# Patient Record
Sex: FEMALE | Race: OTHER | Employment: OTHER | URBAN - METROPOLITAN AREA
[De-identification: names, ages, dates, MRNs, and addresses within clinical notes are randomized per-mention and may not be internally consistent; named-entity substitution may affect disease eponyms.]

---

## 2017-01-05 RX ORDER — HYDROCHLOROTHIAZIDE 12.5 MG/1
12.5 TABLET ORAL DAILY
Qty: 60 TAB | Refills: 5 | Status: SHIPPED | OUTPATIENT
Start: 2017-01-05 | End: 2017-07-15 | Stop reason: SDUPTHER

## 2017-06-13 ENCOUNTER — OFFICE VISIT (OUTPATIENT)
Dept: INTERNAL MEDICINE CLINIC | Age: 67
End: 2017-06-13

## 2017-06-13 VITALS
DIASTOLIC BLOOD PRESSURE: 78 MMHG | WEIGHT: 157 LBS | HEART RATE: 85 BPM | HEIGHT: 63 IN | OXYGEN SATURATION: 95 % | TEMPERATURE: 98.5 F | RESPIRATION RATE: 16 BRPM | SYSTOLIC BLOOD PRESSURE: 135 MMHG | BODY MASS INDEX: 27.82 KG/M2

## 2017-06-13 DIAGNOSIS — E78.00 ELEVATED LDL CHOLESTEROL LEVEL: ICD-10-CM

## 2017-06-13 DIAGNOSIS — Z12.11 COLON CANCER SCREENING: ICD-10-CM

## 2017-06-13 DIAGNOSIS — Z00.00 ENCOUNTER FOR MEDICARE ANNUAL WELLNESS EXAM: Primary | ICD-10-CM

## 2017-06-13 DIAGNOSIS — Z78.0 POSTMENOPAUSAL: ICD-10-CM

## 2017-06-13 DIAGNOSIS — Z11.59 NEED FOR HEPATITIS C SCREENING TEST: ICD-10-CM

## 2017-06-13 DIAGNOSIS — Z91.89 AT RISK FOR SIDE EFFECT OF MEDICATION: ICD-10-CM

## 2017-06-13 DIAGNOSIS — M85.80 OSTEOPENIA, UNSPECIFIED LOCATION: ICD-10-CM

## 2017-06-13 DIAGNOSIS — Z12.31 ENCOUNTER FOR SCREENING MAMMOGRAM FOR BREAST CANCER: ICD-10-CM

## 2017-06-13 PROBLEM — Z71.89 ADVANCE DIRECTIVE DISCUSSED WITH PATIENT: Status: ACTIVE | Noted: 2017-06-13

## 2017-06-13 NOTE — PROGRESS NOTES
Ofe Cordero is a 75 y.o. female and presents for annual Medicare Wellness Visit. She has URI symptoms 5 days ago. She has nasal congestion that is clear. No fevers, chills, night sweats. Dry cough. She denies any chest pain, shortness of breath, abdominal pain, headaches, but has dizziness. She said she can be watching TV or driving and gets dizzy. She does not want to take anything. She also has tingling in her fingers and pain in right hip area. Think this is due to arthritis, but osteopenia maybe worsening. Will do dexa scan. Problem List: Reviewed with patient and discussed risk factors. Patient Active Problem List   Diagnosis Code    Advance directive discussed with patient Z71.89       Current medical providers:  Patient Care Team:  Isaias Oakley MD as PCP - General (Internal Medicine)  Ruben Reid MD (Internal Medicine)    PSH: Reviewed with patient  Past Surgical History:   Procedure Laterality Date    HX CATARACT REMOVAL  4/2013    left    HX OTHER SURGICAL  1975    dnetal upper bridge    HX TUBAL LIGATION          SH: Reviewed with patient  Social History   Substance Use Topics    Smoking status: Never Smoker    Smokeless tobacco: Never Used    Alcohol use 0.5 oz/week     1 Glasses of wine per week      Comment: Socially. FH: Reviewed with patient  History reviewed. No pertinent family history. Medications/Allergies: Reviewed with patient  Current Outpatient Prescriptions on File Prior to Visit   Medication Sig Dispense Refill    hydroCHLOROthiazide (HYDRODIURIL) 12.5 mg tablet Take 1 Tab by mouth daily. 60 Tab 5    meclizine (ANTIVERT) 25 mg tablet Take 1 Tab by mouth three (3) times daily as needed. 15 Tab 0     No current facility-administered medications on file prior to visit.        Allergies   Allergen Reactions    Peanut Anaphylaxis    Scopolamine Nausea Only       Objective:  Visit Vitals    /78 (BP 1 Location: Right arm, BP Patient Position: Sitting)    Pulse 85    Temp 98.5 °F (36.9 °C) (Oral)    Resp 16    Ht 5' 3\" (1.6 m)    Wt 157 lb (71.2 kg)    SpO2 95%    BMI 27.81 kg/m2    Body mass index is 27.81 kg/(m^2). Assessment of cognitive impairment: Alert and oriented x 3    Depression Screen:   PHQ over the last two weeks 6/13/2017   Little interest or pleasure in doing things Not at all   Feeling down, depressed or hopeless Not at all   Total Score PHQ 2 0   Trouble falling or staying asleep, or sleeping too much -   Feeling tired or having little energy -   Poor appetite or overeating -   Feeling bad about yourself - or that you are a failure or have let yourself or your family down -   Trouble concentrating on things such as school, work, reading or watching TV -   Moving or speaking so slowly that other people could have noticed; or the opposite being so fidgety that others notice -   Thoughts of being better off dead, or hurting yourself in some way -   PHQ 9 Score -   How difficult have these problems made it for you to do your work, take care of your home and get along with others -       Fall Risk Assessment:    Fall Risk Assessment, last 12 mths 6/13/2017   Able to walk? Yes   Fall in past 12 months? No   Fall with injury? -   Number of falls in past 12 months -   Fall Risk Score -       Functional Ability:   Does the patient exhibit a steady gait? yes   How long did it take the patient to get up and walk from a sitting position? 4 seconds   Is the patient self reliant?  (ie can do own laundry, meals, household chores)  yes     Does the patient handle his/her own medications? yes     Does the patient handle his/her own money? yes     Is the patients home safe (ie good lighting, handrails on stairs and bath, etc.)? yes     Did you notice or did patient express any hearing difficulties? no     Did you notice or did patient express any vision difficulties? yes     Were distance and reading eye charts used?   no       Advance Care Planning:   Patient was offered the opportunity to discuss advance care planning:  yes     Does patient have an Advance Directive:  no   If no, did you provide information on Caring Connections? yes       Plan:      Orders Placed This Encounter    DEXA BONE DENSITY STUDY AXIAL    OCCULT BLOOD, IMMUNOASSAY (FIT)    LIPID PANEL    METABOLIC PANEL, COMPREHENSIVE    HEPATITIS C AB    VITAMIN D, 25 HYDROXY       Health Maintenance   Topic Date Due    Hepatitis C Screening  1950    DTaP/Tdap/Td series (1 - Tdap) 03/09/1970    BREAST CANCER SCRN MAMMOGRAM  03/09/1999    FOBT Q 1 YEAR AGE 50-75  09/08/2016    INFLUENZA AGE 9 TO ADULT  08/01/2017    Pneumococcal 65+ Low/Medium Risk (2 of 2 - PPSV23) 01/11/2018    GLAUCOMA SCREENING Q2Y  06/03/2018    MEDICARE YEARLY EXAM  06/14/2018    OSTEOPOROSIS SCREENING (DEXA)  Completed    ZOSTER VACCINE AGE 60>  Completed       *Patient verbalized understanding and agreement with the plan. A copy of the After Visit Summary with personalized health plan was given to the patient today.           ROS:   General: negative for - chills, fatigue, fever, weight loss or weight gain, night sweats  HEENT: negative for - no sore throat, nasal congestion, vision problems or ear problems  Resp: negative for - cough, shortness of breath or wheezing  CV: negative for - chest pain, palpitations, orthopnea or PND,  GI: negative for - abdominal pain, change in bowel habits, constipation, diarrhea, blood or black tarry stools, or nausea/vomiting  : negative for - dysuria, hematuria, incontinence, pelvic pain or vulvar/vaginal symptoms  Heme: negative for -excessive bleeding or bruising  Endo: negative for - hot flashes, polydipsia/polyuria or hot or cold intolerance  MSK: negative for -, joint swelling or muscle pain, positive for  joint pain  Neuro: negative for - numbness,headache or dizziness, positive for  tingling,   Derm: negative for - dry skin, hair changes, rash or skin lesion changes  Psych: negative for - anxiety, depression, irritability or mood swings or insomnia    OBJECTIVE:  PHYSICAL EXAM: Vitals:   Vitals:    06/13/17 1322   BP: 135/78   Pulse: 85   Resp: 16   Temp: 98.5 °F (36.9 °C)   TempSrc: Oral   SpO2: 95%   Weight: 157 lb (71.2 kg)   Height: 5' 3\" (1.6 m)     Generally: Pleasant female in no acute distress    HEENT exam: Head: atraumatic     Eyes: Pupils equally round and reactive to light, Fundoscopic exam is                       normal               Ears: bilaterally: Normal tympanic membrane, no erythema or exudate,                         normal light Reflex, but ears bilateral have fluid    Nares: moist mucosa, no erythema               Mouth: clear, no erythema or exudate     Neck: supple, no lymphadenopathy, negative thyromegaly, negative                                   carotid bruits bilaterally    Cardiac exam: regular, rate, and rhythm. No murmurs, gallops, or rubs. Normal S1 and S2.    Pulmonary exam: Clear to ausculation bilaterally    Abdominal exam: Positive bowel sounds in all four quadrants, soft, nondistended, nontender. No hepatosplenomegaly. Extremities: 2+ dorsalis pedis bilaterally. No pedal edema bilaterally.              LABS/RADIOLOGICAL TESTS:   Lab Results   Component Value Date/Time    WBC 8.3 10/25/2016 02:32 AM    HGB 14.1 10/25/2016 02:32 AM    HCT 42.5 10/25/2016 02:32 AM    PLATELET 783 60/31/4197 02:32 AM     Lab Results   Component Value Date/Time    Sodium 144 10/25/2016 02:32 AM    Potassium 3.9 10/25/2016 02:32 AM    Chloride 99 10/25/2016 02:32 AM    CO2 27 10/25/2016 02:32 AM    Glucose 79 10/25/2016 02:32 AM    BUN 13 10/25/2016 02:32 AM    Creatinine 0.79 10/25/2016 02:32 AM     Lab Results   Component Value Date/Time    Cholesterol, total 217 05/19/2014 03:35 PM    HDL Cholesterol 65 05/19/2014 03:35 PM    LDL, calculated 122 05/19/2014 03:35 PM    Triglyceride 151 05/19/2014 03:35 PM     No results found for: GPT    Previous labs      ASSESSMENT/PLAN:      ICD-10-CM ICD-9-CM    1. Encounter for Medicare annual wellness exam Z00.00 V70.0    2. Osteopenia, unspecified location M85.80 733.90 VITAMIN D, 25 HYDROXY      DEXA BONE DENSITY STUDY AXIAL   3. Postmenopausal Z78.0 V49.81 DEXA BONE DENSITY STUDY AXIAL   4. Colon cancer screening Z12.11 V76.51 OCCULT BLOOD, IMMUNOASSAY (FIT)   5. At risk for side effect of medication H59.03 L27.10 METABOLIC PANEL, COMPREHENSIVE   6. Elevated LDL cholesterol level E78.00 272.0 LIPID PANEL   7. Need for hepatitis C screening test Z11.59 V73.89 HEPATITIS C AB   She takes HCTZ for swelling in her legs. 8. Ordered mammogram  9.. Patient verbalized understanding and agreement with the plan. 10.  Patient was given after visit summary. 11.   Follow-up Disposition:  Return in about 1 year (around 6/13/2018) for medicare wellness subsequent exam. or sooner if worsening symptoms.         Jaspal Lucas MD

## 2017-06-13 NOTE — ACP (ADVANCE CARE PLANNING)
Advance Care Planning (ACP) Provider Conversation Snapshot    Date of ACP Conversation: 06/13/17  Persons included in Conversation:  patient  Length of ACP Conversation in minutes:  16 minutes    Authorized Decision Maker (if patient is incapable of making informed decisions):    This person is:   none          For Patients with Decision Making Capacity:   Values/Goals: Exploration of values, goals, and preferences if recovery is not expected, even with continued medical treatment in the event of:  Imminent death     Conversation Outcomes / Follow-Up Plan:   Recommended completion of Advance Directive form after review of ACP materials and conversation with prospective healthcare agent

## 2017-06-13 NOTE — PROGRESS NOTES
ROOM # 2    Lidia Queen presents today for   Chief Complaint   Patient presents with   Stacie Hui Annual Wellness Visit         Hypertension    Cough     since last friday    Finger Pain       Lidia Queen preferred language for health care discussion is english/other.     Is someone accompanying this pt? no    Is the patient using any DME equipment during OV? no    Depression Screening:  PHQ over the last two weeks 6/13/2017 10/25/2016 4/21/2016 6/2/2015 5/19/2014   Little interest or pleasure in doing things Not at all Nearly every day Several days Not at all Not at all   Feeling down, depressed or hopeless Not at all Nearly every day Several days Not at all Not at all   Total Score PHQ 2 0 6 2 0 0   Trouble falling or staying asleep, or sleeping too much - Nearly every day - - -   Feeling tired or having little energy - Nearly every day - - -   Poor appetite or overeating - Not at all - - -   Feeling bad about yourself - or that you are a failure or have let yourself or your family down - Not at all - - -   Trouble concentrating on things such as school, work, reading or watching TV - Not at all - - -   Moving or speaking so slowly that other people could have noticed; or the opposite being so fidgety that others notice - Not at all - - -   Thoughts of being better off dead, or hurting yourself in some way - Not at all - - -   PHQ 9 Score - 12 - - -   How difficult have these problems made it for you to do your work, take care of your home and get along with others - Not difficult at all - - -       Learning Assessment:  Learning Assessment 5/19/2014   PRIMARY LEARNER Patient   HIGHEST LEVEL OF EDUCATION - PRIMARY LEARNER  SOME COLLEGE   BARRIERS PRIMARY LEARNER NONE   PRIMARY LANGUAGE ENGLISH    NEED No   LEARNER PREFERENCE PRIMARY DEMONSTRATION   LEARNING SPECIAL TOPICS no   ANSWERED BY patient   RELATIONSHIP SELF       Abuse Screening:  Abuse Screening Questionnaire 6/13/2017 12/14/2015 9/8/2015   Do you ever feel afraid of your partner? N N N   Are you in a relationship with someone who physically or mentally threatens you? N N N   Is it safe for you to go home? Jaycee Calvillo       Fall Risk  Fall Risk Assessment, last 12 mths 6/13/2017 10/25/2016 4/21/2016 6/2/2015 6/2/2015 5/19/2014   Able to walk? Yes Yes Yes Yes Yes Yes   Fall in past 12 months? No Yes No Yes No No   Fall with injury? - No - Yes - -   Number of falls in past 12 months - 5 - 1 - -   Fall Risk Score - 5 - 2 - -       Health Maintenance reviewed and discussed per provider. Yes    Lucero Alves is due for Hep C (pending) Tdap, Mammogram, zoster, pneu (pending). fit/colon, MWV (doing today). .  Please order/place referral if appropriate. Advance Directive:  1. Do you have an advance directive in place? Patient Reply: no    2. If not, would you like material regarding how to put one in place? Patient Reply: yes    Coordination of Care:  1. Have you been to the ER, urgent care clinic since your last visit? Hospitalized since your last visit? no    2. Have you seen or consulted any other health care providers outside of the [address] [place] [address] since your last visit? Include any pap smears or colon screening.  no

## 2017-06-13 NOTE — MR AVS SNAPSHOT
Visit Information Date & Time Provider Department Dept. Phone Encounter #  
 6/13/2017  1:30 PM Tammie Mehta MD [facility] [phone] [id number] Follow-up Instructions Return in about 1 year (around 6/13/2018) for medicare wellness subsequent exam. Upcoming Health Maintenance Date Due Hepatitis C Screening 1950 DTaP/Tdap/Td series (1 - Tdap) 3/9/1970 BREAST CANCER SCRN MAMMOGRAM 3/9/1999 FOBT Q 1 YEAR AGE 50-75 9/8/2016 INFLUENZA AGE 9 TO ADULT 8/1/2017 Pneumococcal 65+ Low/Medium Risk (2 of 2 - PPSV23) 1/11/2018 GLAUCOMA SCREENING Q2Y 6/3/2018 MEDICARE YEARLY EXAM 6/14/2018 Allergies as of 6/13/2017  Review Complete On: 6/13/2017 By: Tammie Mehta MD  
  
 Severity Noted Reaction Type Reactions Peanut High 09/08/2015    Anaphylaxis Scopolamine  10/25/2016    Nausea Only Current Immunizations  Never Reviewed Name Date Influenza High Dose Vaccine PF 10/25/2016 Not reviewed this visit You Were Diagnosed With   
  
 Codes Comments Colon cancer screening    -  Primary ICD-10-CM: Z12.11 ICD-9-CM: V76.51 Need for hepatitis C screening test     ICD-10-CM: Z11.59 
ICD-9-CM: V73.89 At risk for side effect of medication     ICD-10-CM: Z91.89 ICD-9-CM: V49.89 Osteopenia, unspecified location     ICD-10-CM: M85.80 ICD-9-CM: 733.90 Elevated LDL cholesterol level     ICD-10-CM: E78.00 ICD-9-CM: 272.0 Postmenopausal     ICD-10-CM: Z78.0 ICD-9-CM: V49.81 Vitals BP Pulse Temp Resp Height(growth percentile) Weight(growth percentile) 135/78 (BP 1 Location: Right arm, BP Patient Position: Sitting) 85 98.5 °F (36.9 °C) (Oral) 16 5' 3\" (1.6 m) 157 lb (71.2 kg) SpO2 BMI OB Status Smoking Status 95% 27.81 kg/m2 Postmenopausal Never Smoker Vitals History BMI and BSA Data  Body Mass Index Body Surface Area  
 27.81 kg/m 2 1.78 m 2  
  
  
 Preferred Pharmacy Pharmacy Name Phone [address], [address] [facility] [phone] Your Updated Medication List  
  
   
This list is accurate as of: 6/13/17  1:48 PM.  Always use your most recent med list.  
  
  
  
  
 hydroCHLOROthiazide 12.5 mg tablet Commonly known as:  HYDRODIURIL Take 1 Tab by mouth daily. meclizine 25 mg tablet Commonly known as:  ANTIVERT Take 1 Tab by mouth three (3) times daily as needed. Follow-up Instructions Return in about 1 year (around 6/13/2018) for medicare wellness subsequent exam. To-Do List   
 06/15/2017 Imaging:  DEXA BONE DENSITY STUDY AXIAL   
  
 07/13/2017 Lab:  HEPATITIS C AB   
  
 07/13/2017 Lab:  LIPID PANEL   
  
 07/13/2017 Lab:  METABOLIC PANEL, COMPREHENSIVE   
  
 07/13/2017 Lab:  OCCULT BLOOD, IMMUNOASSAY (FIT)   
  
 07/13/2017 Lab:  VITAMIN D, 25 HYDROXY Patient Instructions 1) follow-up in 1 yr or sooner if worsening symptoms. Upper Respiratory Infection (Cold): Care Instructions Your Care Instructions An upper respiratory infection, or URI, is an infection of the nose, sinuses, or throat. URIs are spread by coughs, sneezes, and direct contact. The common cold is the most frequent kind of URI. The flu and sinus infections are other kinds of URIs. Almost all URIs are caused by viruses. Antibiotics won't cure them. But you can treat most infections with home care. This may include drinking lots of fluids and taking over-the-counter pain medicine. You will probably feel better in 4 to 10 days. The doctor has checked you carefully, but problems can develop later. If you notice any problems or new symptoms, get medical treatment right away. Follow-up care is a key part of your treatment and safety.  Be sure to make and go to all appointments, and call your doctor if you are having problems. It's also a good idea to know your test results and keep a list of the medicines you take. How can you care for yourself at home? · To prevent dehydration, drink plenty of fluids, enough so that your urine is light yellow or clear like water. Choose water and other caffeine-free clear liquids until you feel better. If you have kidney, heart, or liver disease and have to limit fluids, talk with your doctor before you increase the amount of fluids you drink. · Take an over-the-counter pain medicine, such as acetaminophen (Tylenol), ibuprofen (Advil, Motrin), or naproxen (Aleve). Read and follow all instructions on the label. · Before you use cough and cold medicines, check the label. These medicines may not be safe for young children or for people with certain health problems. · Be careful when taking over-the-counter cold or flu medicines and Tylenol at the same time. Many of these medicines have acetaminophen, which is Tylenol. Read the labels to make sure that you are not taking more than the recommended dose. Too much acetaminophen (Tylenol) can be harmful. · Get plenty of rest. 
· Do not smoke or allow others to smoke around you. If you need help quitting, talk to your doctor about stop-smoking programs and medicines. These can increase your chances of quitting for good. When should you call for help? Call 911 anytime you think you may need emergency care. For example, call if: 
· You have severe trouble breathing. Call your doctor now or seek immediate medical care if: 
· You seem to be getting much sicker. · You have new or worse trouble breathing. · You have a new or higher fever. · You have a new rash. Watch closely for changes in your health, and be sure to contact your doctor if: 
· You have a new symptom, such as a sore throat, an earache, or sinus pain. · You cough more deeply or more often, especially if you notice more mucus or a change in the color of your mucus. · You do not get better as expected. Where can you learn more? Go to http://kimo-alex.info/. Enter F267 in the search box to learn more about \"Upper Respiratory Infection (Cold): Care Instructions. \" Current as of: June 30, 2016 Content Version: 11.2 © 7055-0635 [employer]. Care instructions adapted under license by [employer] (which disclaims liability or warranty for this information). If you have questions about a medical condition or this instruction, always ask your healthcare professional. [address] any warranty or liability for your use of this information. Introducing [facility]! [facility] introduces [employer] patient portal. Now you can access parts of your medical record, email your doctor's office, and request medication refills online. 1. In your internet browser, go to [email or web address] 2. Click on the First Time User? Click Here link in the Sign In box. You will see the New Member Sign Up page. 3. Enter your [employer] Access Code exactly as it appears below. You will not need to use this code after youve completed the sign-up process. If you do not sign up before the expiration date, you must request a new code. · [employer] Access Code: [email or web address] Expires: 9/11/2017  1:47 PM 
 
4. Enter the last four digits of your Social Security Number (xxxx) and Date of Birth (mm/dd/yyyy) as indicated and click Submit. You will be taken to the next sign-up page. 5. Create a idemamat ID. This will be your [employer] login ID and cannot be changed, so think of one that is secure and easy to remember. 6. Create a [employer] password. You can change your password at any time. 7. Enter your Password Reset Question and Answer. This can be used at a later time if you forget your password. 8. Enter your e-mail address. You will receive e-mail notification when new information is available in [address] 9. Click Sign Up. You can now view and download portions of your medical record. 10. Click the Download Summary menu link to download a portable copy of your medical information. If you have questions, please visit the Frequently Asked Questions section of the [employer] website. Remember, [employer] is NOT to be used for urgent needs. For medical emergencies, dial 911. Now available from your iPhone and Android! Please provide this summary of care documentation to your next provider. Your primary care clinician is listed as Heber Tapia. If you have any questions after today's visit, please call [phone].

## 2017-06-13 NOTE — PATIENT INSTRUCTIONS
1) follow-up in 1 yr or sooner if worsening symptoms. Upper Respiratory Infection (Cold): Care Instructions  Your Care Instructions    An upper respiratory infection, or URI, is an infection of the nose, sinuses, or throat. URIs are spread by coughs, sneezes, and direct contact. The common cold is the most frequent kind of URI. The flu and sinus infections are other kinds of URIs. Almost all URIs are caused by viruses. Antibiotics won't cure them. But you can treat most infections with home care. This may include drinking lots of fluids and taking over-the-counter pain medicine. You will probably feel better in 4 to 10 days. The doctor has checked you carefully, but problems can develop later. If you notice any problems or new symptoms, get medical treatment right away. Follow-up care is a key part of your treatment and safety. Be sure to make and go to all appointments, and call your doctor if you are having problems. It's also a good idea to know your test results and keep a list of the medicines you take. How can you care for yourself at home? · To prevent dehydration, drink plenty of fluids, enough so that your urine is light yellow or clear like water. Choose water and other caffeine-free clear liquids until you feel better. If you have kidney, heart, or liver disease and have to limit fluids, talk with your doctor before you increase the amount of fluids you drink. · Take an over-the-counter pain medicine, such as acetaminophen (Tylenol), ibuprofen (Advil, Motrin), or naproxen (Aleve). Read and follow all instructions on the label. · Before you use cough and cold medicines, check the label. These medicines may not be safe for young children or for people with certain health problems. · Be careful when taking over-the-counter cold or flu medicines and Tylenol at the same time. Many of these medicines have acetaminophen, which is Tylenol.  Read the labels to make sure that you are not taking more than the recommended dose. Too much acetaminophen (Tylenol) can be harmful. · Get plenty of rest.  · Do not smoke or allow others to smoke around you. If you need help quitting, talk to your doctor about stop-smoking programs and medicines. These can increase your chances of quitting for good. When should you call for help? Call 911 anytime you think you may need emergency care. For example, call if:  · You have severe trouble breathing. Call your doctor now or seek immediate medical care if:  · You seem to be getting much sicker. · You have new or worse trouble breathing. · You have a new or higher fever. · You have a new rash. Watch closely for changes in your health, and be sure to contact your doctor if:  · You have a new symptom, such as a sore throat, an earache, or sinus pain. · You cough more deeply or more often, especially if you notice more mucus or a change in the color of your mucus. · You do not get better as expected. Where can you learn more? Go to http://kimo-alex.info/. Enter D762 in the search box to learn more about \"Upper Respiratory Infection (Cold): Care Instructions. \"  Current as of: June 30, 2016  Content Version: 11.2  © 3118-4968 [employer]. Care instructions adapted under license by [employer] (which disclaims liability or warranty for this information). If you have questions about a medical condition or this instruction, always ask your healthcare professional. [place] [address] any warranty or liability for your use of this information.

## 2017-06-29 ENCOUNTER — TELEPHONE (OUTPATIENT)
Dept: INTERNAL MEDICINE CLINIC | Age: 67
End: 2017-06-29

## 2017-06-29 NOTE — TELEPHONE ENCOUNTER
Please let pt know that labs drawn on 6/27/17 were normal except :    1) lipids are high. TC: 214 and needs to be 200 or less, Trig 168 and needs to be 150 or less, LDL up at 121 and needs to be 100 or less. Start fish oil 1000mg (DHA+EPA=1000mg) 2 po daily. Work on diet and exercise. Mail low chol diet info to pt.     2) stool study negative for blood.

## 2017-07-06 DIAGNOSIS — Z11.59 NEED FOR HEPATITIS C SCREENING TEST: ICD-10-CM

## 2017-07-06 DIAGNOSIS — E78.00 ELEVATED LDL CHOLESTEROL LEVEL: ICD-10-CM

## 2017-07-06 DIAGNOSIS — M85.80 OSTEOPENIA, UNSPECIFIED LOCATION: ICD-10-CM

## 2017-07-06 DIAGNOSIS — Z12.11 COLON CANCER SCREENING: ICD-10-CM

## 2017-07-06 DIAGNOSIS — Z91.89 AT RISK FOR SIDE EFFECT OF MEDICATION: ICD-10-CM

## 2017-08-03 ENCOUNTER — TELEPHONE (OUTPATIENT)
Dept: INTERNAL MEDICINE CLINIC | Age: 67
End: 2017-08-03

## 2017-08-03 ENCOUNTER — HOSPITAL ENCOUNTER (OUTPATIENT)
Dept: LAB | Age: 67
Discharge: HOME | End: 2017-08-03

## 2017-08-03 ENCOUNTER — OFFICE VISIT (OUTPATIENT)
Dept: INTERNAL MEDICINE CLINIC | Age: 67
End: 2017-08-03

## 2017-08-03 VITALS
DIASTOLIC BLOOD PRESSURE: 88 MMHG | TEMPERATURE: 97.9 F | BODY MASS INDEX: 27.68 KG/M2 | WEIGHT: 156.2 LBS | HEIGHT: 63 IN | OXYGEN SATURATION: 97 % | SYSTOLIC BLOOD PRESSURE: 141 MMHG | RESPIRATION RATE: 18 BRPM | HEART RATE: 76 BPM

## 2017-08-03 DIAGNOSIS — H53.9 VISION CHANGES: Primary | ICD-10-CM

## 2017-08-03 DIAGNOSIS — H53.131 SUDDEN VISUAL LOSS OF RIGHT EYE: ICD-10-CM

## 2017-08-03 PROCEDURE — 99001 SPECIMEN HANDLING PT-LAB: CPT | Performed by: NURSE PRACTITIONER

## 2017-08-03 RX ORDER — ASPIRIN 81 MG/1
TABLET ORAL DAILY
COMMUNITY

## 2017-08-03 NOTE — MR AVS SNAPSHOT
Visit Information Date & Time Provider Department Dept. Phone Encounter #  
 8/3/2017  4:30 PM Nini Li NP [phone] Follow-up Instructions Return in about 1 week (around 8/10/2017) for F/up. Upcoming Health Maintenance Date Due INFLUENZA AGE 9 TO ADULT 8/1/2017 DTaP/Tdap/Td series (1 - Tdap) 11/11/2017* BREAST CANCER SCRN MAMMOGRAM 1/27/2018* Pneumococcal 65+ Low/Medium Risk (2 of 2 - PPSV23) 1/11/2018 MEDICARE YEARLY EXAM 6/14/2018 FOBT Q 1 YEAR AGE 50-75 6/27/2018 GLAUCOMA SCREENING Q2Y 1/24/2019 *Topic was postponed. The date shown is not the original due date. Allergies as of 8/3/2017  Review Complete On: 6/13/2017 By: Chris Kirby MD  
  
 Severity Noted Reaction Type Reactions Peanut High 09/08/2015    Anaphylaxis Scopolamine  10/25/2016    Nausea Only Current Immunizations  Never Reviewed Name Date Influenza High Dose Vaccine PF 10/25/2016 Not reviewed this visit You Were Diagnosed With   
  
 Codes Comments Vision changes    -  Primary ICD-10-CM: H53.9 ICD-9-CM: 368.9 Sudden visual loss of right eye     ICD-10-CM: H53.131 ICD-9-CM: 368.11 Vitals BP Pulse Temp Resp Height(growth percentile) Weight(growth percentile) 141/88 (BP 1 Location: Right arm, BP Patient Position: Sitting) 76 97.9 °F (36.6 °C) (Oral) 18 5' 3\" (1.6 m) 156 lb 3.2 oz (70.9 kg) SpO2 BMI OB Status Smoking Status 97% 27.67 kg/m2 Postmenopausal Never Smoker BMI and BSA Data Body Mass Index Body Surface Area  
 27.67 kg/m 2 1.78 m 2 Preferred Pharmacy Pharmacy Name Phone [address] [facility], [address] [place] [address] [phone] Your Updated Medication List  
  
   
This list is accurate as of: 8/3/17  5:15 PM.  Always use your most recent med list.  
  
  
  
  
 aspirin delayed-release 81 mg tablet Take  by mouth daily. FISH OIL OMEGA 3-6-9 PO Take  by mouth. hydroCHLOROthiazide 12.5 mg tablet Commonly known as:  HYDRODIURIL  
TAKE 1 TABLET BY MOUTH DAILY(GENERIC FOR HYDRODIURIL) meclizine 25 mg tablet Commonly known as:  ANTIVERT Take 1 Tab by mouth three (3) times daily as needed. We Performed the Following AMB POC EKG ROUTINE W/ 12 LEADS, INTER & REP [34765 CPT(R)] Follow-up Instructions Return in about 1 week (around 8/10/2017) for F/up. To-Do List   
 08/03/2017 Lab:  C REACTIVE PROTEIN, QT   
  
 08/03/2017 Lab:  CBC W/O DIFF   
  
 08/03/2017 Lab:  SED RATE (ESR) 08/04/2017 ECHO:  2D ECHO COMPLETE ADULT (TTE) W OR WO CONTR   
  
 08/04/2017 Imaging:  DUPLEX CAROTID BILATERAL   
  
 08/04/2017 Imaging:  MRA BRAIN W WO CONT   
  
 08/04/2017 Imaging:  MRI BRAIN W WO CONT Referral Information Referral ID Referred By Referred To  
  
 [id number] Funmi Monaco Not Available Visits Status Start Date End Date 1 New Request 8/3/17 8/3/18 If your referral has a status of pending review or denied, additional information will be sent to support the outcome of this decision. Referral ID Referred By Referred To  
 [id number] Funmi Monaco Not Available Visits Status Start Date End Date 1 New Request 8/3/17 8/3/18 If your referral has a status of pending review or denied, additional information will be sent to support the outcome of this decision. Introducing [address]! [facility] introduces ClarityAd patient portal. Now you can access parts of your medical record, email your doctor's office, and request medication refills online. 1. In your internet browser, go to [email or web address] 2. Click on the First Time User? Click Here link in the Sign In box. You will see the New Member Sign Up page. 3. Enter your CyberPatrol Access Code exactly as it appears below. You will not need to use this code after youve completed the sign-up process. If you do not sign up before the expiration date, you must request a new code. · CyberPatrol Access Code: -[email or web address] Expires: 9/11/2017  1:47 PM 
 
4. Enter the last four digits of your Social Security Number (xxxx) and Date of Birth (mm/dd/yyyy) as indicated and click Submit. You will be taken to the next sign-up page. 5. Create a CyberPatrol ID. This will be your CyberPatrol login ID and cannot be changed, so think of one that is secure and easy to remember. 6. Create a CyberPatrol password. You can change your password at any time. 7. Enter your Password Reset Question and Answer. This can be used at a later time if you forget your password. 8. Enter your e-mail address. You will receive e-mail notification when new information is available in [address]. 9. Click Sign Up. You can now view and download portions of your medical record. 10. Click the Download Summary menu link to download a portable copy of your medical information. If you have questions, please visit the Frequently Asked Questions section of the CyberPatrol website. Remember, CyberPatrol is NOT to be used for urgent needs. For medical emergencies, dial 911. Now available from your iPhone and Android! Please provide this summary of care documentation to your next provider. Your primary care clinician is listed as Heber Tapia. If you have any questions after today's visit, please call [phone].

## 2017-08-03 NOTE — PROGRESS NOTES
ROOM # 9    Tracy Gardiner presents today for   Chief Complaint   Patient presents with    Loss of Vision     2 episodes in last few weeks        Tracy Gardiner preferred language for health care discussion is english/other.     Is someone accompanying this pt? no    Is the patient using any DME equipment during OV? no    Depression Screening:  PHQ over the last two weeks 6/13/2017 10/25/2016 4/21/2016 6/2/2015 5/19/2014   Little interest or pleasure in doing things Not at all Nearly every day Several days Not at all Not at all   Feeling down, depressed or hopeless Not at all Nearly every day Several days Not at all Not at all   Total Score PHQ 2 0 6 2 0 0   Trouble falling or staying asleep, or sleeping too much - Nearly every day - - -   Feeling tired or having little energy - Nearly every day - - -   Poor appetite or overeating - Not at all - - -   Feeling bad about yourself - or that you are a failure or have let yourself or your family down - Not at all - - -   Trouble concentrating on things such as school, work, reading or watching TV - Not at all - - -   Moving or speaking so slowly that other people could have noticed; or the opposite being so fidgety that others notice - Not at all - - -   Thoughts of being better off dead, or hurting yourself in some way - Not at all - - -   PHQ 9 Score - 12 - - -   How difficult have these problems made it for you to do your work, take care of your home and get along with others - Not difficult at all - - -       Learning Assessment:  Learning Assessment 5/19/2014   PRIMARY LEARNER Patient   HIGHEST LEVEL OF EDUCATION - PRIMARY LEARNER  SOME COLLEGE   BARRIERS PRIMARY LEARNER NONE   PRIMARY LANGUAGE ENGLISH    NEED No   LEARNER PREFERENCE PRIMARY DEMONSTRATION   LEARNING SPECIAL TOPICS no   ANSWERED BY patient   RELATIONSHIP SELF       Abuse Screening:  Abuse Screening Questionnaire 6/13/2017 12/14/2015 9/8/2015   Do you ever feel afraid of your partner? N N N   Are you in a relationship with someone who physically or mentally threatens you? N N N   Is it safe for you to go home? Kathy Berg       Fall Risk  Fall Risk Assessment, last 12 mths 6/13/2017 10/25/2016 4/21/2016 6/2/2015 6/2/2015 5/19/2014   Able to walk? Yes Yes Yes Yes Yes Yes   Fall in past 12 months? No Yes No Yes No No   Fall with injury? - No - Yes - -   Number of falls in past 12 months - 5 - 1 - -   Fall Risk Score - 5 - 2 - -       Health Maintenance reviewed and discussed per provider. Yes    Chemo Kyle is due for flu health maintenance. I have asked that he/she contact his primary care provider for follow-up on this health maintenance. Advance Directive:  1. Do you have an advance directive in place? Patient Reply: no    2. If not, would you like material regarding how to put one in place? Patient Reply: no    Coordination of Care:  1. Have you been to the ER, urgent care clinic since your last visit? Hospitalized since your last visit? no    2. Have you seen or consulted any other health care providers outside of the [address] [place] [address] since your last visit? Include any pap smears or colon screening. no    Please see Red banners under Allergies, Med rec, Immunizations to remove outside inquires. All correct information has been verified with patient and added to chart. Comments: patient has been experiencing problems for a few weeks now.

## 2017-08-03 NOTE — TELEPHONE ENCOUNTER
2 pt identifiers confirmed. Pt called very concerned and scared per her words after her visit yesterday with her ophthalmologist Dr. Jesus Gregorio ([phone]). She states that more than once she has lost her vision for a few hours in one eye. Per pt. her eye doctor believes she may have some sort of a blockage keeping her eye from getting adequate blood flow and has started her on asa. Per pt he Impressed upon her greatly that she could not put this off or wait even a week. She expected he had sent some paperwork about testing he would like her to have, however no such paperwork has been found as of yet. Pt. Stated this issue needs to be checked out asap as if it is a blood flow issue it could be an impending stroke etc. Pt. States she will call his office again now to f/u on paperwork and lab orders that were supposed to have been ordered/requested. Dr. Dinora Gonzalez, please advise.

## 2017-08-03 NOTE — TELEPHONE ENCOUNTER
2 pt. Identifiers confirmed. Pt. Did f/u c t/c to eye doctor's office. She states they are in the process of faxing the info to [facility] and that they will want an MRA (not MRI). Pt. States she is not having any s/s or blindness right now and she would rather come in for OV than to go to the ER. Pt. States she will be on her way now to be seen on first floor for eval and order placement. No other questions/concerns at this time.

## 2017-08-03 NOTE — TELEPHONE ENCOUNTER
Fax received from [facility] specialist for impression/care plan. Pt. To come in for labs and OV c NP Yanelis Allen today. This encounter to be closed.

## 2017-08-03 NOTE — PROGRESS NOTES
HISTORY OF PRESENT ILLNESS  Herlinda Barber is a 75 y.o. female. HPI Comments: Pt presents today per f/u opthalmology visit yesterday. History of sudden vision loss right eye x2 episodes. Patient reports complete loss of vision in her right eye upon wakening in the morning, first episode duration 7 hours, second episode 2 weeks after first episode with duration 2 hours. Associated symptoms include dizziness, R side neck \"choking sensation\", stiff neck, new mental changes/confusion, loss of balance. She denies CP, palpitation, SOB, HA, extremity weakness, peripheral edema/neuropathy, gait changes, facial drooping, drooling. Loss of Vision    The history is provided by the patient. This is a new problem. The current episode started more than 1 week ago. Episode frequency: x2. The problem has not changed since onset. The right eye is affected. The injury mechanism is unknown. The patient is experiencing no pain. There is no history of trauma to the eye. There is no known exposure to pink eye. Associated symptoms include blindness and dizziness. Pertinent negatives include no blurred vision, no discharge, no double vision, no photophobia, no eye redness, no tingling, no weakness, no pain and no head injury. She has tried nothing for the symptoms. Review of Systems   Constitutional: Negative. Negative for malaise/fatigue. HENT: Negative. Negative for hearing loss and tinnitus. Eyes: Positive for blindness. Negative for blurred vision, double vision, photophobia, pain, discharge and redness. Complete loss of vision R eye     Respiratory: Negative for shortness of breath. Cardiovascular: Negative for chest pain, palpitations and leg swelling. Musculoskeletal: Positive for neck pain. Negative for falls. Neurological: Positive for dizziness and sensory change. Negative for tingling, tremors, speech change, focal weakness, seizures, weakness and headaches.        Physical Exam Constitutional: She is oriented to person, place, and time. She appears well-developed and well-nourished. Eyes: Conjunctivae and EOM are normal. Pupils are equal, round, and reactive to light. Neck: Normal range of motion. Neck supple. Decreased carotid pulses (on R) present. No JVD present. Carotid bruit is not present. No tracheal deviation present. No thyromegaly present. Cardiovascular: Normal rate, regular rhythm, normal heart sounds and intact distal pulses. Exam reveals no gallop and no friction rub. No murmur heard. Pulmonary/Chest: Effort normal and breath sounds normal. No respiratory distress. Musculoskeletal: Normal range of motion. Neurological: She is alert and oriented to person, place, and time. She has normal strength and normal reflexes. No cranial nerve deficit. Skin: Skin is warm and dry. Psychiatric: Her behavior is normal. Thought content normal.     Visit Vitals    /88 (BP 1 Location: Right arm, BP Patient Position: Sitting)    Pulse 76    Temp 97.9 °F (36.6 °C) (Oral)    Resp 18    Ht 5' 3\" (1.6 m)    Wt 156 lb 3.2 oz (70.9 kg)    SpO2 97%    BMI 27.67 kg/m2        ASSESSMENT and PLAN    ICD-10-CM ICD-9-CM    1. Vision changes H53.9 368.9 MRI BRAIN W WO CONT      MRA BRAIN W WO CONT      2D ECHO COMPLETE ADULT (TTE) W OR WO CONTR      SED RATE (ESR)      C REACTIVE PROTEIN, QT      CBC W/O DIFF      AMB POC EKG ROUTINE W/ 12 LEADS, INTER & REP      DUPLEX CAROTID BILATERAL   2. Sudden visual loss of right eye  H53.131 368.11 DUPLEX CAROTID BILATERAL     Reviewed plan with patient including diagnoses, treatment and follow up. Orders placed for STAT imaging. EKG and blood work collected today. Patient verbalize understanding.

## 2017-08-04 ENCOUNTER — TELEPHONE (OUTPATIENT)
Dept: INTERNAL MEDICINE CLINIC | Age: 67
End: 2017-08-04

## 2017-08-04 ENCOUNTER — HOSPITAL ENCOUNTER (OUTPATIENT)
Dept: MRI IMAGING | Age: 67
Discharge: HOME | End: 2017-08-04
Attending: NURSE PRACTITIONER
Payer: MEDICARE

## 2017-08-04 ENCOUNTER — HOSPITAL ENCOUNTER (OUTPATIENT)
Dept: VASCULAR SURGERY | Age: 67
Discharge: HOME | End: 2017-08-04
Attending: NURSE PRACTITIONER
Payer: MEDICARE

## 2017-08-04 VITALS — WEIGHT: 154 LBS | BODY MASS INDEX: 27.28 KG/M2

## 2017-08-04 DIAGNOSIS — H53.131 SUDDEN VISUAL LOSS OF RIGHT EYE: ICD-10-CM

## 2017-08-04 DIAGNOSIS — H53.9 VISION CHANGES: ICD-10-CM

## 2017-08-04 DIAGNOSIS — I45.10 RBBB: Primary | ICD-10-CM

## 2017-08-04 LAB
CREAT UR-MCNC: 0.9 MG/DL (ref 0.6–1.3)
CRP SERPL-MCNC: 4.1 MG/L (ref 0–4.9)
ERYTHROCYTE [DISTWIDTH] IN BLOOD BY AUTOMATED COUNT: 13.7 % (ref 12.3–15.4)
ERYTHROCYTE [SEDIMENTATION RATE] IN BLOOD BY WESTERGREN METHOD: 13 MM/HR (ref 0–40)
HCT VFR BLD AUTO: 41 % (ref 34–46.6)
HGB BLD-MCNC: 13.4 G/DL (ref 11.1–15.9)
MCH RBC QN AUTO: 29.9 PG (ref 26.6–33)
MCHC RBC AUTO-ENTMCNC: 32.7 G/DL (ref 31.5–35.7)
MCV RBC AUTO: 92 FL (ref 79–97)
PLATELET # BLD AUTO: 319 X10E3/UL (ref 150–379)
RBC # BLD AUTO: 4.48 X10E6/UL (ref 3.77–5.28)
WBC # BLD AUTO: 8.5 X10E3/UL (ref 3.4–10.8)

## 2017-08-04 PROCEDURE — 93880 EXTRACRANIAL BILAT STUDY: CPT

## 2017-08-04 PROCEDURE — 82565 ASSAY OF CREATININE: CPT

## 2017-08-04 PROCEDURE — A9585 GADOBUTROL INJECTION: HCPCS | Performed by: NURSE PRACTITIONER

## 2017-08-04 PROCEDURE — 70553 MRI BRAIN STEM W/O & W/DYE: CPT

## 2017-08-04 PROCEDURE — 70546 MR ANGIOGRAPH HEAD W/O&W/DYE: CPT

## 2017-08-04 PROCEDURE — 74011250636 HC RX REV CODE- 250/636: Performed by: NURSE PRACTITIONER

## 2017-08-04 RX ADMIN — GADOBUTROL 7 ML: 604.72 INJECTION INTRAVENOUS at 19:35

## 2017-08-04 NOTE — TELEPHONE ENCOUNTER
Incoming call from vascular lab at [facility] @ 2:26PM. Rad tech said preliminary results showed right carotid normal, but left had minimal blockage. Pt was notified and she is in the process of trying to get her MRI/MRA earlier today.

## 2017-08-04 NOTE — PROCEDURES
[facility]  *** FINAL REPORT ***    Name: Татьяна García  MRN: [id number]    Outpatient  : 09 Mar 1950  HIS Order #: [id number]  [id number] [facility] Visit #: [id number]  Date: 04 Aug 2017    TYPE OF TEST: Cerebrovascular Duplex    REASON FOR TEST  Visual Disturbance    Right Carotid:-             Proximal               Mid                 Distal  cm/s  Systolic  Diastolic  Systolic  Diastolic  Systolic  Diastolic  CCA:    664.4      25.0      100.0      31.0       78.0      26.0  Bulb:  ECA:     84.0      17.0  ICA:     83.0      28.0      107.0      47.0       96.0      41.0  ICA/CCA:  1.0       1.9    ICA Stenosis: Normal    Right Vertebral:-  Finding: Antegrade  Sys:       61.0  Lucinda:       25.0    Right Subclavian: Normal    Left Carotid:-            Proximal                Mid                 Distal  cm/s  Systolic  Diastolic  Systolic  Diastolic  Systolic  Diastolic  CCA:     42.5      27.0       90.0      27.0       89.0      25.0  Bulb:  ECA:     80.0      14.0  ICA:     73.0      30.0       96.0      34.0      106.0      37.0  ICA/CCA:  1.1       1.4    ICA Stenosis: <50%    Left Vertebral:-  Finding: Antegrade  Sys:       45.0  Lucinda:       12.0    Left Subclavian: Normal    INTERPRETATION/FINDINGS  Duplex images were obtained using 2-D gray scale, color flow, and  spectral Doppler analysis. 1. No significant stenosis of the right internal carotid artery. 2. Mild plaquing of the left internal carotid artery with less than  50% stenosis. 3. No significant stenosis in the external carotid arteries  bilaterally. 4. Antegrade flow in both vertebral arteries. 5. Normal flow in both subclavian arteries. Plaque Morphology:  1. Hyperechoic plaque in the proximal left ICA. ADDITIONAL COMMENTS  Wet reading given to Dr. Aaliyah Viramontes at 2:30 p.m. I have personally reviewed the data relevant to the interpretation of  this  study. TECHNOLOGIST: Kaveh Oneal.  Brayden Bolton  Signed: 2017 02:36 PM    PHYSICIAN: Em Ramirez MD  Signed: 08/04/2017 08:25 PM

## 2017-08-05 NOTE — TELEPHONE ENCOUNTER
Incoming call from radiology on 8/4/17, regarding pt's MRI/MRA results. He said everything was normal.    Called pt on 8/4/17 and let her know results of MRI/MRA were normal and labs were normal. Since so far all imaging and labs were okay will refer to cardiology and wait for ECHO results which will be done on Thursday, 8/13/17. Her EKG done on 8/3/17 showed RBBB which is new from previous EKG's on 5/19/14 & 4/22/13 which were all normal. Will refer to cardiology. Will call on 8/7/17, Monday for STAT cardiology referral as it is the weekend and office is closed on the weekend. Pt is taking ASA 325mg one po daily. She was told to continue this. Pt verbalized understanding.

## 2017-08-07 ENCOUNTER — TELEPHONE (OUTPATIENT)
Dept: INTERNAL MEDICINE CLINIC | Age: 67
End: 2017-08-07

## 2017-08-07 NOTE — TELEPHONE ENCOUNTER
Incoming call from [place] with [employer] in regards to authorization for TTE for pt. Infomred them of reason for procedure.   They stated will fax over clinical review information to be completed and faxed back stated understanding    Transaction number: [id number]  Return fax number: [phone]

## 2017-08-10 ENCOUNTER — HOSPITAL ENCOUNTER (OUTPATIENT)
Dept: NON INVASIVE DIAGNOSTICS | Age: 67
Discharge: HOME | End: 2017-08-10
Attending: NURSE PRACTITIONER
Payer: MEDICARE

## 2017-08-10 DIAGNOSIS — H53.9 VISION CHANGES: ICD-10-CM

## 2017-08-10 PROCEDURE — 93306 TTE W/DOPPLER COMPLETE: CPT

## 2017-08-11 ENCOUNTER — TELEPHONE (OUTPATIENT)
Dept: CARDIOLOGY CLINIC | Age: 67
End: 2017-08-11

## 2017-08-11 NOTE — TELEPHONE ENCOUNTER
Contacted PCP to check on status of insurance referral.  Had to provide information to have it done again. Message being given to referral coordinator.

## 2017-08-15 ENCOUNTER — OFFICE VISIT (OUTPATIENT)
Dept: CARDIOLOGY CLINIC | Age: 67
End: 2017-08-15

## 2017-08-15 VITALS
WEIGHT: 154 LBS | HEART RATE: 70 BPM | SYSTOLIC BLOOD PRESSURE: 117 MMHG | HEIGHT: 63 IN | BODY MASS INDEX: 27.29 KG/M2 | DIASTOLIC BLOOD PRESSURE: 79 MMHG | OXYGEN SATURATION: 97 %

## 2017-08-15 DIAGNOSIS — I25.9 CHEST PAIN DUE TO MYOCARDIAL ISCHEMIA, UNSPECIFIED ISCHEMIC CHEST PAIN TYPE: Primary | ICD-10-CM

## 2017-08-15 DIAGNOSIS — R07.89 OTHER CHEST PAIN: ICD-10-CM

## 2017-08-15 RX ORDER — NITROGLYCERIN 0.4 MG/1
0.4 TABLET SUBLINGUAL
Qty: 25 TAB | Refills: 3 | Status: SHIPPED | OUTPATIENT
Start: 2017-08-15

## 2017-08-15 NOTE — MR AVS SNAPSHOT
Visit Information Date & Time Provider Department Dept. Phone Encounter #  
 8/15/2017  1:00 PM Tyler Robin  [facility] Specialist at [facility] [address] [phone] [id number] Follow-up Instructions Return in about 1 week (around 8/22/2017). Upcoming Health Maintenance Date Due INFLUENZA AGE 9 TO ADULT 8/1/2017 DTaP/Tdap/Td series (1 - Tdap) 11/11/2017* BREAST CANCER SCRN MAMMOGRAM 1/27/2018* Pneumococcal 65+ Low/Medium Risk (2 of 2 - PPSV23) 1/11/2018 MEDICARE YEARLY EXAM 6/14/2018 FOBT Q 1 YEAR AGE 50-75 6/27/2018 GLAUCOMA SCREENING Q2Y 1/24/2019 *Topic was postponed. The date shown is not the original due date. Allergies as of 8/15/2017  Review Complete On: 8/15/2017 By: Rudy Dunham LPN Severity Noted Reaction Type Reactions Peanut High 09/08/2015    Anaphylaxis Scopolamine  10/25/2016    Nausea Only Current Immunizations  Never Reviewed Name Date Influenza High Dose Vaccine PF 10/25/2016 Not reviewed this visit You Were Diagnosed With   
  
 Codes Comments Chest pain due to myocardial ischemia, unspecified ischemic chest pain type ([facility]    -  Primary ICD-10-CM: I20.9 ICD-9-CM: 786.50 Other chest pain     ICD-10-CM: R07.89 ICD-9-CM: 786.59 Vitals BP Pulse Height(growth percentile) Weight(growth percentile) SpO2 BMI  
 117/79 70 5' 3\" (1.6 m) 154 lb (69.9 kg) 97% 27.28 kg/m2 OB Status Smoking Status Postmenopausal Never Smoker BMI and BSA Data Body Mass Index Body Surface Area  
 27.28 kg/m 2 1.76 m 2 Preferred Pharmacy Pharmacy Name Phone [address], [address] [place], [place]. \H[facility] and Outlook.\"" [phone] Your Updated Medication List  
  
   
This list is accurate as of: 8/15/17  1:15 PM.  Always use your most recent med list.  
  
  
  
  
 aspirin delayed-release 81 mg tablet Take  by mouth daily. FISH OIL OMEGA 3-6-9 PO Take  by mouth. hydroCHLOROthiazide 12.5 mg tablet Commonly known as:  HYDRODIURIL  
TAKE 1 TABLET BY MOUTH DAILY(GENERIC FOR HYDRODIURIL) meclizine 25 mg tablet Commonly known as:  ANTIVERT Take 1 Tab by mouth three (3) times daily as needed. Follow-up Instructions Return in about 1 week (around 8/22/2017). To-Do List   
 08/18/2017 Imaging:  NM CARDIAC SPECT W STRS/REST MULT   
  
 08/18/2017 ECG:  NUCLEAR STRESS TEST Introducing [facility]! [facility] introduces [employer] patient portal. Now you can access parts of your medical record, email your doctor's office, and request medication refills online. 1. In your internet browser, go to [email or web address] 2. Click on the First Time User? Click Here link in the Sign In box. You will see the New Member Sign Up page. 3. Enter your [employer] Access Code exactly as it appears below. You will not need to use this code after youve completed the sign-up process. If you do not sign up before the expiration date, you must request a new code. · [employer] Access Code: [email or web address] Expires: 9/11/2017  1:47 PM 
 
4. Enter the last four digits of your Social Security Number (xxxx) and Date of Birth (mm/dd/yyyy) as indicated and click Submit. You will be taken to the next sign-up page. 5. Create a [employer] ID. This will be your [employer] login ID and cannot be changed, so think of one that is secure and easy to remember. 6. Create a [employer] password. You can change your password at any time. 7. Enter your Password Reset Question and Answer. This can be used at a later time if you forget your password. 8. Enter your e-mail address. You will receive e-mail notification when new information is available in [address]. 9. Click Sign Up. You can now view and download portions of your medical record. 10. Click the Download Summary menu link to download a portable copy of your medical information. If you have questions, please visit the Frequently Asked Questions section of the [employer] website. Remember, [employer] is NOT to be used for urgent needs. For medical emergencies, dial 911. Now available from your iPhone and Android! Please provide this summary of care documentation to your next provider. Your primary care clinician is listed as Heber Tapia. If you have any questions after today's visit, please call [phone].

## 2017-08-15 NOTE — PROGRESS NOTES
[facility]    Ms. Stella Matos is a 75year old female with a history of borderline hypertension and history of vertigo. Ms. Stella Matos was asked to come see me for evaluation of abnormal EKG. According to Ms. Stella Matos, she denies any prior history of myocardial infarction or congestive heart failure. Ms. Stella Matos had a bilateral cataract surgery in January. Approximately two months ago in June, 2017, she had an episode where she woke up around 5:00 in the morning with loss of vision. She denied any black, however she felt like she could not see as she had a white curtain in front of her right eye. After five or six hours it was resolved and she had her vision back. She had another similar episode one week later in June. Eventually she was seen by ophthalmologist and she was told she may have had a TIA. She had extensive workup, including carotid dopplers, MRI and EKG. After EKG was abnormal she was asked to come see me. Ms. Stella Matos denies any prior myocardial infarction or congestive heart failure. She denies any palpitations, presyncope or syncope. Upon further questioning, she does complain of the last 12-18 months she has been experiencing occasional chest heaviness lasting anywhere from 1-2 minutes with occasional jaw radiation. This happens probably anywhere from 2-3 times a month. It resolves itself. Sometimes it happens with rest, sometimes with exertion. She denies any associated sweating, diaphoresis or nausea. She occasionally gets short of breath with this chest discomfort. She never told other physicians about this. Denies any nausea, vomiting, abdominal pain, fever, chills, sputum production.  No hematuria or other bleeding complaints    Past Medical History:   Diagnosis Date    Advance directive discussed with patient 06/13/2017    Borderline hypertension     Calculus of kidney          Past Surgical History:   Procedure Laterality Date    HX CATARACT REMOVAL  4/2013    left    HX OTHER SURGICAL  1975    dnetal upper bridge    HX TUBAL LIGATION         Current Outpatient Prescriptions   Medication Sig    OMEGA-3 FATTY ACIDS/FISH OIL (FISH OIL OMEGA 3-6-9 PO) Take  by mouth.  hydroCHLOROthiazide (HYDRODIURIL) 12.5 mg tablet TAKE 1 TABLET BY MOUTH DAILY(GENERIC FOR HYDRODIURIL)    meclizine (ANTIVERT) 25 mg tablet Take 1 Tab by mouth three (3) times daily as needed.  aspirin delayed-release 81 mg tablet Take  by mouth daily. No current facility-administered medications for this visit. Allergies and Sensitivities:  Allergies   Allergen Reactions    Peanut Anaphylaxis    Scopolamine Nausea Only       Family History:  No family history on file. Social History:  Social History   Substance Use Topics    Smoking status: Never Smoker    Smokeless tobacco: Never Used    Alcohol use 0.5 oz/week     1 Glasses of wine per week      Comment: Socially. She  reports that she has never smoked. She has never used smokeless tobacco.  She  reports that she drinks about 0.5 oz of alcohol per week     Review of Systems:  Cardiac symptoms as noted above in HPI. All others negative. Denies fatigue, malaise, skin rash,  blurring vision, photophobia, neck pain, hemoptysis, chronic cough, nausea, vomiting, hematuria, burning micturition, BRBPR, chronic headaches. Physical Exam:  BP Readings from Last 3 Encounters:   08/15/17 117/79   08/03/17 141/88   06/13/17 135/78         Pulse Readings from Last 3 Encounters:   08/15/17 70   08/03/17 76   06/13/17 85          Wt Readings from Last 3 Encounters:   08/15/17 154 lb (69.9 kg)   08/04/17 154 lb (69.9 kg)   08/03/17 156 lb 3.2 oz (70.9 kg)       Constitutional: Oriented to person, place, and time. HENT: Head: Normocephalic and atraumatic. Eyes: Conjunctivae and extraocular motions are normal.   Neck: No JVD present. Carotid bruit is not appreciated. Cardiovascular: Regular rhythm. No murmur, gallop or rubs appreciated  Lung: Breath sounds normal. No respiratory distress. No ronchi or rales appreciated  Abdominal: No tenderness. No rebound and no guarding. Musculoskeletal: There is no lower extremity edema. No cynosis  Lymphadenopathy:  No cervical or supraclavicular adenopathy appriciated. Neurological: No gross motor deficit noted. Skin: No visible skin rash noted. No Ear discharge noted  Psychiatric: Normal mood and affect. Good distal pulse      Review of Data  LABS:   Lab Results   Component Value Date/Time    Sodium 144 10/25/2016 02:32 AM    Potassium 3.9 10/25/2016 02:32 AM    Chloride 99 10/25/2016 02:32 AM    CO2 27 10/25/2016 02:32 AM    Glucose 79 10/25/2016 02:32 AM    BUN 13 10/25/2016 02:32 AM    Creatinine 0.79 10/25/2016 02:32 AM     Lipids Latest Ref Rng & Units 5/19/2014   Chol, Total 100 - 199 mg/dL 217(H)   HDL >39 mg/dL 65   LDL 0 - 99 mg/dL 122(H)   Trig 0 - 149 mg/dL 151(H)   Some recent data might be hidden     Lab Results   Component Value Date/Time    ALT (SGPT) 19 10/25/2016 02:32 AM     Lab Results   Component Value Date/Time    Hemoglobin A1c 5.3 10/25/2016 02:32 AM       EKG  (08/17) Sinus rhythm at 79 beats per minute. Right bundle branch block. Nonspecific T wave changes. Normal MI interval.  I personally reviewed and interpreted this EKG. ECHO (08/17)  SUMMARY:  Left ventricle: Systolic function was normal. Ejection fraction was  estimated in the range of 55 % to 60 %. There were no regional wall motion  abnormalities. Atrial septum: There was no evidence of intracardiac shunt by  peripherally-administered agitated saline contrast.    Mitral valve: There was mild regurgitation. Tricuspid valve: There was mild regurgitation. STRESS TEST (EST, PHARM, NUC, ECHO etc)    IMPRESSION & PLAN:  Ms. Shital Tay is a 75year old female with no significant past medical history.     Abnormal EKG:  Ms. Shital Tay had an EKG this month, which showed new onset right  bundle branch block compared to EKG from 2015. She does not report any awareness of any symptoms of palpitations, presyncope or syncope. Echocardiogram recently as mentioned above showed a normal ejection fraction without any obvious structural heart disease. For now I would continue with clinical observation. Chest pain: Ms. Heber Beal has been experiencing exertional chest tightness as mentioned above. Considering her age and risk factors, ischemic workup is appropriate. Nuclear stress test has been ordered. Aspirin 81 mg was advised for patient to take on a daily basis. Sublingual nitroglycerin will be provided and instructions on how to use it was described to the patient. I will make further recommendations based on stress test findings. Borderline hypertension:  Ms. Heber Beal denies any prior history of hypertension, however she is on Hydrochlorothiazide because of occasional lower extremity swelling. Currently there is no swelling. Echocardiogram as mentioned above without any hypertensive cardiovascular heart disease. Ms. Heber Beal had a sudden episode of vision loss as mentioned above. MRI of the neck and the head was unremarkable. Carotid doppler did not show any significant obstructive disease of the carotid artery. EKG showed right bundle branch block, but sinus rhythm. She denies any episode of palpitation, presyncope or syncope. We discussed about the use of Holter monitor, however patient would like to wait. Aspirin 81 mg is recommended to take on a daily basis. Lipid profile will be ordered. Importance of diet and exercise was discussed with patient. This plan was discussed with patient who is in agreement. Thank you for allowing me to participate in patient care. Please feel free to call me if you have any question or concern. Maira Michele MD  Please note:  This document has been produced using voice recognition software. Unrecognized errors in transcription may be present.

## 2017-08-15 NOTE — PROGRESS NOTES
1. Have you been to the ER, urgent care clinic since your last visit? Hospitalized since your last visit? No    2. Have you seen or consulted any other health care providers outside of the [address] [place] [address] since your last visit? Include any pap smears or colon screening.  No

## 2017-08-25 DIAGNOSIS — E78.5 DYSLIPIDEMIA: Primary | ICD-10-CM

## 2017-09-01 ENCOUNTER — HOSPITAL ENCOUNTER (OUTPATIENT)
Dept: NUCLEAR MEDICINE | Age: 67
Discharge: HOME | End: 2017-09-01
Attending: INTERNAL MEDICINE
Payer: MEDICARE

## 2017-09-01 ENCOUNTER — HOSPITAL ENCOUNTER (OUTPATIENT)
Dept: NON INVASIVE DIAGNOSTICS | Age: 67
Discharge: HOME | End: 2017-09-01
Attending: INTERNAL MEDICINE
Payer: MEDICARE

## 2017-09-01 DIAGNOSIS — I25.9 CHEST PAIN DUE TO MYOCARDIAL ISCHEMIA, UNSPECIFIED ISCHEMIC CHEST PAIN TYPE: ICD-10-CM

## 2017-09-01 DIAGNOSIS — R07.89 OTHER CHEST PAIN: ICD-10-CM

## 2017-09-01 PROCEDURE — 78452 HT MUSCLE IMAGE SPECT MULT: CPT

## 2017-09-01 PROCEDURE — 93017 CV STRESS TEST TRACING ONLY: CPT

## 2017-09-06 ENCOUNTER — OFFICE VISIT (OUTPATIENT)
Dept: CARDIOLOGY CLINIC | Age: 67
End: 2017-09-06

## 2017-09-06 VITALS
HEIGHT: 63 IN | OXYGEN SATURATION: 97 % | BODY MASS INDEX: 28.17 KG/M2 | WEIGHT: 159 LBS | SYSTOLIC BLOOD PRESSURE: 135 MMHG | DIASTOLIC BLOOD PRESSURE: 78 MMHG | HEART RATE: 71 BPM

## 2017-09-06 DIAGNOSIS — R07.89 OTHER CHEST PAIN: Primary | ICD-10-CM

## 2017-09-06 DIAGNOSIS — I10 ESSENTIAL HYPERTENSION WITH GOAL BLOOD PRESSURE LESS THAN 140/90: ICD-10-CM

## 2017-09-06 RX ORDER — OMEPRAZOLE 20 MG/1
20 TABLET, DELAYED RELEASE ORAL DAILY
COMMUNITY

## 2017-09-06 RX ORDER — METOPROLOL SUCCINATE 25 MG/1
12.5 TABLET, EXTENDED RELEASE ORAL DAILY
COMMUNITY
End: 2017-09-08 | Stop reason: SDUPTHER

## 2017-09-06 NOTE — PATIENT INSTRUCTIONS
Stop HCTZ    Start Toprol XL 12.5mg daily    Start Aspirin and Fish Oil    Follow up in 3 months     Metoprolol (Lopressor, Toprol XL) - (By mouth)   Why this medicine is used:   Treats high blood pressure, chest pain, and heart failure. Contact a nurse or doctor right away if you have:  · Lightheadedness, dizziness, fainting  · Rapid weight gain; swelling in your hands, ankles, or feet     Common side effects:  · Mild dizziness  · Tiredness  © 2017 [address] [employer] [employer] Information is for End User's use only and may not be sold, redistributed or otherwise used for commercial purposes. Metoprolol (By mouth)   Metoprolol (met-oh-PROE-lol)  Treats high blood pressure, angina (chest pain), and heart failure. May lower the risk of death after a heart attack. This medicine is a beta-blocker. Brand Name(s): Lopressor, Toprol XL   There may be other brand names for this medicine. When This Medicine Should Not Be Used: This medicine is not right for everyone. Do not use if you had an allergic reaction to metoprolol or similar medicines. Do not use this medicine if you have certain blood circulation or heart problems. Ask your doctor about these problems. How to Use This Medicine:   Tablet, Long Acting Tablet  · Take your medicine as directed. Your dose may need to be changed several times to find what works best for you. · Take this medicine with a meal or right after a meal. Take this medicine the same way every day, at the same time. · Swallow the tablet whole with a glass of water. You may break the extended-release tablet in half, but do not chew or crush it. · Missed dose: Take a dose as soon as you remember. If it is almost time for your next dose, wait until then and take a regular dose. Do not take extra medicine to make up for a missed dose. · Store the medicine in a closed container at room temperature, away from heat, moisture, and direct light.   Drugs and Foods to Avoid:   Ask your doctor or pharmacist before using any other medicine, including over-the-counter medicines, vitamins, and herbal products. · Some medicines can affect how metoprolol works. Tell your doctor if you are taking any of the following:   ¨ Digoxin, dipyridamole, hydralazine, hydroxychloroquine, methyldopa, quinidine  ¨ Medicine to treat depression (such as bupropion, clomipramine, desipramine, fluoxetine, fluvoxamine, paroxetine, sertraline), medicine to treat mental illness (such as chlorpromazine, fluphenazine, haloperidol, thioridazine), medicine for heart rhythm problems (such as propafenone), HIV/AIDS medicine (such as ritonavir), medicine to treat a fungus infection (such as terbinafine), a monoamine oxidase inhibitor (MAOI), an ergot medicine for headaches, a calcium channel blocker (such as amlodipine, diltiazem, verapamil), or an alpha blocker (such as clonidine, prazosin, reserpine, guanethidine)  Warnings While Using This Medicine:   · Tell your doctor if you are pregnant or breastfeeding, or if you have blood vessel, heart, or circulation problems (such as heart failure, rhythm problems, or slow heartbeat). Tell your doctor if you have kidney disease, liver disease, diabetes, lung disease (such as asthma), an overactive thyroid, or a history of allergies. · This medicine may cause worse symptoms of heart failure while the dose is being adjusted. · Do not stop using this medicine suddenly. Your doctor will need to slowly decrease your dose before you stop it completely. · Tell any doctor or dentist who treats you that you are using this medicine. You may need to stop using this medicine several days before you have surgery or medical tests. · This medicine could lower your blood pressure too much, especially when you first use it or if you are dehydrated. Stand or sit up slowly if you feel lightheaded or dizzy. · This medicine may make you dizzy or drowsy.  Do not drive or do anything else that could be dangerous until you know how this medicine affects you. · Your doctor will check your progress and the effects of this medicine at regular visits. Keep all appointments. · Keep all medicine out of the reach of children. Never share your medicine with anyone. Possible Side Effects While Using This Medicine:   Call your doctor right away if you notice any of these side effects:  · Allergic reaction: Itching or hives, swelling in your face or hands, swelling or tingling in your mouth or throat, chest tightness, trouble breathing  · Lightheadedness, dizziness, or fainting  · Slow heartbeat  · Swelling in your hands, ankles, or feet, trouble breathing, tiredness  · Worsening chest pain  If you notice these less serious side effects, talk with your doctor:   · Diarrhea  · Mild dizziness or tiredness  If you notice other side effects that you think are caused by this medicine, tell your doctor. Call your doctor for medical advice about side effects. You may report side effects to FDA at 7-344-FDA-7985  © 2017 [facility] Information is for End User's use only and may not be sold, redistributed or otherwise used for commercial purposes. The above information is an  only. It is not intended as medical advice for individual conditions or treatments. Talk to your doctor, nurse or pharmacist before following any medical regimen to see if it is safe and effective for you.

## 2017-09-06 NOTE — MR AVS SNAPSHOT
Visit Information Date & Time Provider Department Dept. Phone Encounter #  
 9/6/2017  2:00 PM Tyler Tate  [facility] [phone] [id number] Follow-up Instructions Return in about 3 months (around 12/6/2017). Your Appointments 12/7/2017  9:15 AM  
Follow Up with Tyler Tate MD  
Cardio Specialist at [facility] App[facility] Note: 3 months [address]  
  
   
 [address] Upcoming Health Maintenance Date Due INFLUENZA AGE 9 TO ADULT 8/1/2017 DTaP/Tdap/Td series (1 - Tdap) 11/11/2017* BREAST CANCER SCRN MAMMOGRAM 1/27/2018* Pneumococcal 65+ Low/Medium Risk (2 of 2 - PPSV23) 1/11/2018 MEDICARE YEARLY EXAM 6/14/2018 FOBT Q 1 YEAR AGE 50-75 6/27/2018 GLAUCOMA SCREENING Q2Y 1/24/2019 *Topic was postponed. The date shown is not the original due date. Allergies as of 9/6/2017  Review Complete On: 9/6/2017 By: Anna Merida Severity Noted Reaction Type Reactions Peanut High 09/08/2015    Anaphylaxis Scopolamine  10/25/2016    Nausea Only Current Immunizations  Never Reviewed Name Date Influenza High Dose Vaccine PF 10/25/2016 Not reviewed this visit Vitals BP Pulse Height(growth percentile) Weight(growth percentile) SpO2 BMI  
 135/78 71 5' 3\" (1.6 m) 159 lb (72.1 kg) 97% 28.17 kg/m2 OB Status Smoking Status Postmenopausal Never Smoker BMI and BSA Data Body Mass Index Body Surface Area  
 28.17 kg/m 2 1.79 m 2 Preferred Pharmacy Pharmacy Name Phone [address], [address] [facility] [phone] Your Updated Medication List  
  
   
This list is accurate as of: 9/6/17  2:29 PM.  Always use your most recent med list.  
  
  
  
  
 aspirin delayed-release 81 mg tablet Take  by mouth daily. FISH OIL OMEGA 3-6-9 PO Take  by mouth.  
  
 meclizine 25 mg tablet Commonly known as:  ANTIVERT Take 1 Tab by mouth three (3) times daily as needed. nitroglycerin 0.4 mg SL tablet Commonly known as:  NITROSTAT  
1 Tab by SubLINGual route every five (5) minutes as needed for Chest Pain. PriLOSEC OTC 20 mg tablet Generic drug:  omeprazole Take 20 mg by mouth daily. TOPROL XL 25 mg XL tablet Generic drug:  metoprolol succinate Take 12.5 mg by mouth daily. Follow-up Instructions Return in about 3 months (around 12/6/2017). Patient Instructions Stop HCTZ Start Toprol XL 12.5mg daily Start Aspirin and Fish Oil Follow up in 3 months Metoprolol (Lopressor, Toprol XL) - (By mouth) Why this medicine is used:  
Treats high blood pressure, chest pain, and heart failure. Contact a nurse or doctor right away if you have: · Lightheadedness, dizziness, fainting · Rapid weight gain; swelling in your hands, ankles, or feet Common side effects: · Mild dizziness · Tiredness © 2017 [address]  Information is for End User's use only and may not be sold, redistributed or otherwise used for commercial purposes. Metoprolol (By mouth) Metoprolol (met-oh-PROE-lol) Treats high blood pressure, angina (chest pain), and heart failure. May lower the risk of death after a heart attack. This medicine is a beta-blocker. Brand Name(s): Lopressor, Toprol XL There may be other brand names for this medicine. When This Medicine Should Not Be Used: This medicine is not right for everyone. Do not use if you had an allergic reaction to metoprolol or similar medicines. Do not use this medicine if you have certain blood circulation or heart problems. Ask your doctor about these problems. How to Use This Medicine:  
Tablet, Long Acting Tablet · Take your medicine as directed. Your dose may need to be changed several times to find what works best for you. · Take this medicine with a meal or right after a meal. Take this medicine the same way every day, at the same time. · Swallow the tablet whole with a glass of water. You may break the extended-release tablet in half, but do not chew or crush it. · Missed dose: Take a dose as soon as you remember. If it is almost time for your next dose, wait until then and take a regular dose. Do not take extra medicine to make up for a missed dose. · Store the medicine in a closed container at room temperature, away from heat, moisture, and direct light. Drugs and Foods to Avoid: Ask your doctor or pharmacist before using any other medicine, including over-the-counter medicines, vitamins, and herbal products. · Some medicines can affect how metoprolol works. Tell your doctor if you are taking any of the following: ¨ Digoxin, dipyridamole, hydralazine, hydroxychloroquine, methyldopa, quinidine ¨ Medicine to treat depression (such as bupropion, clomipramine, desipramine, fluoxetine, fluvoxamine, paroxetine, sertraline), medicine to treat mental illness (such as chlorpromazine, fluphenazine, haloperidol, thioridazine), medicine for heart rhythm problems (such as propafenone), HIV/AIDS medicine (such as ritonavir), medicine to treat a fungus infection (such as terbinafine), a monoamine oxidase inhibitor (MAOI), an ergot medicine for headaches, a calcium channel blocker (such as amlodipine, diltiazem, verapamil), or an alpha blocker (such as clonidine, prazosin, reserpine, guanethidine) Warnings While Using This Medicine: · Tell your doctor if you are pregnant or breastfeeding, or if you have blood vessel, heart, or circulation problems (such as heart failure, rhythm problems, or slow heartbeat).  Tell your doctor if you have kidney disease, liver disease, diabetes, lung disease (such as asthma), an overactive thyroid, or a history of allergies. · This medicine may cause worse symptoms of heart failure while the dose is being adjusted. · Do not stop using this medicine suddenly. Your doctor will need to slowly decrease your dose before you stop it completely. · Tell any doctor or dentist who treats you that you are using this medicine. You may need to stop using this medicine several days before you have surgery or medical tests. · This medicine could lower your blood pressure too much, especially when you first use it or if you are dehydrated. Stand or sit up slowly if you feel lightheaded or dizzy. · This medicine may make you dizzy or drowsy. Do not drive or do anything else that could be dangerous until you know how this medicine affects you. · Your doctor will check your progress and the effects of this medicine at regular visits. Keep all appointments. · Keep all medicine out of the reach of children. Never share your medicine with anyone. Possible Side Effects While Using This Medicine:  
Call your doctor right away if you notice any of these side effects: · Allergic reaction: Itching or hives, swelling in your face or hands, swelling or tingling in your mouth or throat, chest tightness, trouble breathing · Lightheadedness, dizziness, or fainting · Slow heartbeat · Swelling in your hands, ankles, or feet, trouble breathing, tiredness · Worsening chest pain If you notice these less serious side effects, talk with your doctor: · Diarrhea · Mild dizziness or tiredness If you notice other side effects that you think are caused by this medicine, tell your doctor. Call your doctor for medical advice about side effects. You may report side effects to FDA at 6-262-FDA-7378 © 2017 [address] Information is for End User's use only and may not be sold, redistributed or otherwise used for commercial purposes. The above information is an  only. It is not intended as medical advice for individual conditions or treatments. Talk to your doctor, nurse or pharmacist before following any medical regimen to see if it is safe and effective for you. Introducing [facility]! [facility] introduces [employer] patient portal. Now you can access parts of your medical record, email your doctor's office, and request medication refills online. 1. In your internet browser, go to [email or web address] 2. Click on the First Time User? Click Here link in the Sign In box. You will see the New Member Sign Up page. 3. Enter your [employer] Access Code exactly as it appears below. You will not need to use this code after youve completed the sign-up process. If you do not sign up before the expiration date, you must request a new code. · [employer] Access Code: [email or web address] Expires: 9/11/2017  1:47 PM 
 
4. Enter the last four digits of your Social Security Number (xxxx) and Date of Birth (mm/dd/yyyy) as indicated and click Submit. You will be taken to the next sign-up page. 5. Create a [employer] ID. This will be your [employer] login ID and cannot be changed, so think of one that is secure and easy to remember. 6. Create a [employer] password. You can change your password at any time. 7. Enter your Password Reset Question and Answer. This can be used at a later time if you forget your password. 8. Enter your e-mail address. You will receive e-mail notification when new information is available in [address] 9. Click Sign Up. You can now view and download portions of your medical record. 10. Click the Download Summary menu link to download a portable copy of your medical information. If you have questions, please visit the Frequently Asked Questions section of the [employer] website. Remember, [employer] is NOT to be used for urgent needs. For medical emergencies, dial 911. Now available from your iPhone and Android! Please provide this summary of care documentation to your next provider. Your primary care clinician is listed as Heber Tapia. If you have any questions after today's visit, please call [phone].

## 2017-09-06 NOTE — PROGRESS NOTES
Ms. Marni Alcantar is here today for follow up appointment. Since last visit she is *** doing better, however she continues to have occasional random chest discomfort, which she describes as a tight feeling, with occasional jaw radiation. This is not exertional.  Usually this happens without any exertion. There is no association with food. She says this is not a pain. She denies any associated nausea, vomiting or diaphoresis. Overall since last visit her symptoms are slowly getting better. It was not worse to the point that she did not take any nitroglycerin.

## 2017-09-06 NOTE — PROGRESS NOTES
Cardiovascular Specialists    Ms. Fitz Ascencio is a 75 y.o. female with a history of borderline hypertension and history of vertigo. Dictation on: 09/06/2017  2:48 PM by: Ashley Guerrero [[id number]]   Denies any nausea, vomiting, abdominal pain, fever, chills, sputum production. No hematuria or other bleeding complaints    Past Medical History:   Diagnosis Date    Advance directive discussed with patient 06/13/2017    Borderline hypertension     Calculus of kidney          Past Surgical History:   Procedure Laterality Date    HX CATARACT REMOVAL  4/2013    left    HX OTHER SURGICAL  1975    dnetal upper bridge    HX TUBAL LIGATION         Current Outpatient Prescriptions   Medication Sig    hydroCHLOROthiazide (HYDRODIURIL) 12.5 mg tablet TAKE 1 TABLET BY MOUTH DAILY(GENERIC FOR HYDRODIURIL)    nitroglycerin (NITROSTAT) 0.4 mg SL tablet 1 Tab by SubLINGual route every five (5) minutes as needed for Chest Pain.  aspirin delayed-release 81 mg tablet Take  by mouth daily.  OMEGA-3 FATTY ACIDS/FISH OIL (FISH OIL OMEGA 3-6-9 PO) Take  by mouth.  meclizine (ANTIVERT) 25 mg tablet Take 1 Tab by mouth three (3) times daily as needed. No current facility-administered medications for this visit. Allergies and Sensitivities:  Allergies   Allergen Reactions    Peanut Anaphylaxis    Scopolamine Nausea Only       Family History:  No family history on file. Social History:  Social History   Substance Use Topics    Smoking status: Never Smoker    Smokeless tobacco: Never Used    Alcohol use 0.5 oz/week     1 Glasses of wine per week      Comment: Socially. She  reports that she has never smoked. She has never used smokeless tobacco.  She  reports that she drinks about 0.5 oz of alcohol per week     Review of Systems:  Cardiac symptoms as noted above in HPI. All others negative.   Denies fatigue, malaise, skin rash,  blurring vision, photophobia, neck pain, hemoptysis, chronic cough, nausea, vomiting, hematuria, burning micturition, BRBPR, chronic headaches. Physical Exam:  BP Readings from Last 3 Encounters:   09/06/17 135/78   08/15/17 117/79   08/03/17 141/88         Pulse Readings from Last 3 Encounters:   09/06/17 71   08/15/17 70   08/03/17 76          Wt Readings from Last 3 Encounters:   09/06/17 159 lb (72.1 kg)   08/15/17 154 lb (69.9 kg)   08/04/17 154 lb (69.9 kg)       Constitutional: Oriented to person, place, and time. HENT: Head: Normocephalic and atraumatic. Neck: No JVD present. Cardiovascular: Regular rhythm. No murmur, gallop or rubs appreciated  Lung: Breath sounds normal. No respiratory distress. No ronchi or rales appreciated  Abdominal: No tenderness. No rebound and no guarding. Musculoskeletal: There is no lower extremity edema. No cynosis    Review of Data  LABS:   Lab Results   Component Value Date/Time    Sodium 144 10/25/2016 02:32 AM    Potassium 3.9 10/25/2016 02:32 AM    Chloride 99 10/25/2016 02:32 AM    CO2 27 10/25/2016 02:32 AM    Glucose 79 10/25/2016 02:32 AM    BUN 13 10/25/2016 02:32 AM    Creatinine 0.79 10/25/2016 02:32 AM     Lipids Latest Ref Rng & Units 5/19/2014   Chol, Total 100 - 199 mg/dL 217(H)   HDL >39 mg/dL 65   LDL 0 - 99 mg/dL 122(H)   Trig 0 - 149 mg/dL 151(H)   Some recent data might be hidden     Lab Results   Component Value Date/Time    ALT (SGPT) 19 10/25/2016 02:32 AM     Lab Results   Component Value Date/Time    Hemoglobin A1c 5.3 10/25/2016 02:32 AM       EKG  (08/17) Sinus rhythm at 79 beats per minute. Right bundle branch block. Nonspecific T wave changes. Normal PA interval.  I personally reviewed and interpreted this EKG. ECHO (08/17)  SUMMARY:  Left ventricle: Systolic function was normal. Ejection fraction was  estimated in the range of 55 % to 60 %. There were no regional wall motion  abnormalities.     Atrial septum: There was no evidence of intracardiac shunt by  peripherally-administered agitated saline contrast.    Mitral valve: There was mild regurgitation. Tricuspid valve: There was mild regurgitation. STRESS TEST (08/17)  SUMMARY/IMPRESSION   1. Exercise nuclear stress test using Jose treadmill protocol. 2. Ischemic changes: No ischemic changes with exercise. 3. There is no convincing evidence of significant reversible defect to suggest  ongoing ischemia. Diaphragmatic attenuation artifact noted  4. Calculated ejection fraction of 84% on gated images. Wall motion: Normal.  End-diastolic volume of 42 mL. 5. Low risk stress test    IMPRESSION & PLAN:  Ms. Stella Matos is a 75 y.o. female with no significant past medical history. Chest pain:   Ms. Stella Matos has been experiencing exertional chest tightness as mentioned above. Nuclear stress test in 08/17 , low risk without obvious ischemia. DW patient and sister ( retired nurse ) about result. Reassured. Trial of PPI OTC advised. Also will switch from HCTZ to toprol XL 12.5 mg Daily. ASA 81 mg OTC  If still has symptoms, may consider invasive evaluation eventually   Asked her to try NTG for her symptoms. Borderline hypertension:  Ms. Stella Matos denies any prior history of hypertension, however she is on Hydrochlorothiazide because of occasional lower extremity swelling. Will DC that and use BB as mentioned above. Importance of diet and exercise was discussed with patient. This plan was discussed with patient who is in agreement. Thank you for allowing me to participate in patient care. Please feel free to call me if you have any question or concern. Darren Campbell MD  Please note: This document has been produced using voice recognition software. Unrecognized errors in transcription may be present.

## 2017-09-06 NOTE — Clinical Note
Cardiovascular Specialists Ms. Brandi Guerrero is a 75 y.o. female with a history of borderline hypertension and history of vertigo. Ms. Brandi Guerrero is here today for follow up appointment. Since last visit she is doing better, however she continues to have occasional random chest discomfort, which she describes as a tight feeling, with occasional jaw radiation. This is not exertional.  Usually this happens without any exertion. There is no association with food. She says this is not a pain. She denies any associated nausea, vomiting or diaphoresis. Overall since last visit her symptoms are slowly getting better. It was not worse to the point that she did not take any nitroglycerin. Denies any nausea, vomiting, abdominal pain, fever, chills, sputum production. No hematuria or other bleeding complaints Past Medical History:  
Diagnosis Date  Advance directive discussed with patient 06/13/2017  Borderline hypertension  Calculus of kidney Past Surgical History:  
Procedure Laterality Date  HX CATARACT REMOVAL  4/2013  
 left  HX OTHER SURGICAL  1975  
 dnetal upper bridge  HX TUBAL LIGATION Current Outpatient Prescriptions Medication Sig  
 hydroCHLOROthiazide (HYDRODIURIL) 12.5 mg tablet TAKE 1 TABLET BY MOUTH DAILY(GENERIC FOR HYDRODIURIL)  nitroglycerin (NITROSTAT) 0.4 mg SL tablet 1 Tab by SubLINGual route every five (5) minutes as needed for Chest Pain.  aspirin delayed-release 81 mg tablet Take  by mouth daily.  OMEGA-3 FATTY ACIDS/FISH OIL (FISH OIL OMEGA 3-6-9 PO) Take  by mouth.  meclizine (ANTIVERT) 25 mg tablet Take 1 Tab by mouth three (3) times daily as needed. No current facility-administered medications for this visit. Allergies and Sensitivities: 
Allergies Allergen Reactions  Peanut Anaphylaxis  Scopolamine Nausea Only Family History: No family history on file. Social History: 
Social History Substance Use Topics  Smoking status: Never Smoker  Smokeless tobacco: Never Used  Alcohol use 0.5 oz/week 1 Glasses of wine per week Comment: Socially. She  reports that she has never smoked. She has never used smokeless tobacco.  She  reports that she drinks about 0.5 oz of alcohol per week Review of Systems: 
Cardiac symptoms as noted above in HPI. All others negative. Denies fatigue, malaise, skin rash,  blurring vision, photophobia, neck pain, hemoptysis, chronic cough, nausea, vomiting, hematuria, burning micturition, BRBPR, chronic headaches. Physical Exam: 
BP Readings from Last 3 Encounters:  
09/06/17 135/78  
08/15/17 117/79  
08/03/17 141/88 Pulse Readings from Last 3 Encounters:  
09/06/17 71  
08/15/17 70  
08/03/17 76 Wt Readings from Last 3 Encounters:  
09/06/17 159 lb (72.1 kg) 08/15/17 154 lb (69.9 kg) 08/04/17 154 lb (69.9 kg) Constitutional: Oriented to person, place, and time. HENT: Head: Normocephalic and atraumatic. Neck: No JVD present. Cardiovascular: Regular rhythm. No murmur, gallop or rubs appreciated Lung: Breath sounds normal. No respiratory distress. No ronchi or rales appreciated Abdominal: No tenderness. No rebound and no guarding. Musculoskeletal: There is no lower extremity edema. No cynosis Review of Data LABS:  
Lab Results Component Value Date/Time Sodium 144 10/25/2016 02:32 AM  
 Potassium 3.9 10/25/2016 02:32 AM  
 Chloride 99 10/25/2016 02:32 AM  
 CO2 27 10/25/2016 02:32 AM  
 Glucose 79 10/25/2016 02:32 AM  
 BUN 13 10/25/2016 02:32 AM  
 Creatinine 0.79 10/25/2016 02:32 AM  
 
Lipids Latest Ref Rng & Units 5/19/2014 Chol, Total 100 - 199 mg/dL 217(H) HDL >39 mg/dL 65 LDL 0 - 99 mg/dL 122(H) Trig 0 - 149 mg/dL 151(H) Some recent data might be hidden Lab Results Component Value Date/Time ALT (SGPT) 19 10/25/2016 02:32 AM  
 
Lab Results Component Value Date/Time Hemoglobin A1c 5.3 10/25/2016 02:32 AM  
 
 
EKG 
(08/17) Sinus rhythm at 79 beats per minute. Right bundle branch block. Nonspecific T wave changes. Normal CO interval.  I personally reviewed and interpreted this EKG. ECHO (08/17) SUMMARY: 
Left ventricle: Systolic function was normal. Ejection fraction was 
estimated in the range of 55 % to 60 %. There were no regional wall motion 
abnormalities. Atrial septum: There was no evidence of intracardiac shunt by 
peripherally-administered agitated saline contrast. 
 
Mitral valve: There was mild regurgitation. Tricuspid valve: There was mild regurgitation. STRESS TEST (08/17) SUMMARY/IMPRESSION 1. Exercise nuclear stress test using Jose treadmill protocol. 2. Ischemic changes: No ischemic changes with exercise. 3. There is no convincing evidence of significant reversible defect to suggest 
ongoing ischemia. Diaphragmatic attenuation artifact noted 4. Calculated ejection fraction of 84% on gated images. Wall motion: Normal. 
End-diastolic volume of 42 mL. 5. Low risk stress test 
 
IMPRESSION & PLAN: 
Ms. Katharine Fairbanks is a 75 y.o. female with no significant past medical history. Chest pain:  
Ms. Katharine Fairbanks has been experiencing exertional chest tightness as mentioned above. Nuclear stress test in 08/17 , low risk without obvious ischemia. DW patient and sister ( retired nurse ) about result. Reassured. Trial of PPI OTC advised. Also will switch from HCTZ to toprol XL 12.5 mg Daily. ASA 81 mg OTC If still has symptoms, may consider invasive evaluation eventually Asked her to try NTG for her symptoms. Borderline hypertension:  Ms. Katharine Fairbanks denies any prior history of hypertension, however she is on Hydrochlorothiazide because of occasional lower extremity swelling. Will DC that and use BB as mentioned above. Importance of diet and exercise was discussed with patient. This plan was discussed with patient who is in agreement. Thank you for allowing me to participate in patient care. Please feel free to call me if you have any question or concern. Frank Lujan MD 
Please note: This document has been produced using voice recognition software. Unrecognized errors in transcription may be present.

## 2017-09-08 RX ORDER — METOPROLOL SUCCINATE 25 MG/1
12.5 TABLET, EXTENDED RELEASE ORAL DAILY
Qty: 15 TAB | Refills: 6 | Status: SHIPPED | OUTPATIENT
Start: 2017-09-08 | End: 2017-09-08 | Stop reason: SDUPTHER

## 2017-09-08 RX ORDER — METOPROLOL SUCCINATE 25 MG/1
TABLET, EXTENDED RELEASE ORAL
Qty: 45 TAB | Refills: 6 | Status: SHIPPED | OUTPATIENT
Start: 2017-09-08 | End: 2019-06-19 | Stop reason: SDUPTHER

## 2018-05-29 RX ORDER — METOPROLOL SUCCINATE 25 MG/1
TABLET, EXTENDED RELEASE ORAL
Qty: 45 TAB | Refills: 0 | Status: SHIPPED | OUTPATIENT
Start: 2018-05-29 | End: 2018-08-31 | Stop reason: SDUPTHER

## 2018-05-29 RX ORDER — METOPROLOL SUCCINATE 25 MG/1
TABLET, EXTENDED RELEASE ORAL
Qty: 15 TAB | Refills: 0 | Status: SHIPPED | OUTPATIENT
Start: 2018-05-29 | End: 2018-05-29 | Stop reason: SDUPTHER

## 2018-08-31 RX ORDER — METOPROLOL SUCCINATE 25 MG/1
TABLET, EXTENDED RELEASE ORAL
Qty: 45 TAB | Refills: 0 | Status: SHIPPED | OUTPATIENT
Start: 2018-08-31 | End: 2018-10-29

## 2018-09-19 ENCOUNTER — TELEPHONE (OUTPATIENT)
Dept: INTERNAL MEDICINE CLINIC | Age: 68
End: 2018-09-19

## 2018-10-29 ENCOUNTER — OFFICE VISIT (OUTPATIENT)
Dept: INTERNAL MEDICINE CLINIC | Age: 68
End: 2018-10-29

## 2018-10-29 VITALS
HEIGHT: 63 IN | TEMPERATURE: 97.5 F | BODY MASS INDEX: 28 KG/M2 | DIASTOLIC BLOOD PRESSURE: 83 MMHG | OXYGEN SATURATION: 97 % | RESPIRATION RATE: 18 BRPM | WEIGHT: 158 LBS | SYSTOLIC BLOOD PRESSURE: 138 MMHG | HEART RATE: 66 BPM

## 2018-10-29 DIAGNOSIS — E78.5 DYSLIPIDEMIA: ICD-10-CM

## 2018-10-29 DIAGNOSIS — Z00.00 ENCOUNTER FOR MEDICARE ANNUAL WELLNESS EXAM: Primary | ICD-10-CM

## 2018-10-29 DIAGNOSIS — Z23 ENCOUNTER FOR IMMUNIZATION: ICD-10-CM

## 2018-10-29 DIAGNOSIS — I10 BENIGN HYPERTENSION WITHOUT CHF: ICD-10-CM

## 2018-10-29 NOTE — PATIENT INSTRUCTIONS
1) Follow-up in 1 year or sooner if worsening symptoms. 2) use heating pad, icy/hot, tiger balm for pain. Knee Arthritis: Exercises Your Care Instructions Here are some examples of exercises for knee arthritis. Start each exercise slowly. Ease off the exercise if you start to have pain. Your doctor or physical therapist will tell you when you can start these exercises and which ones will work best for you. How to do the exercises Knee flexion with heel slide 1. Lie on your back with your knees bent. 2. Slide your heel back by bending your affected knee as far as you can. Then hook your other foot around your ankle to help pull your heel even farther back. 3. Hold for about 6 seconds, then rest for up to 10 seconds. 4. Repeat 8 to 12 times. 5. Switch legs and repeat steps 1 through 4, even if only one knee is sore. Everpurse 1. Sit with your affected leg straight and supported on the floor or a firm bed. Place a small, rolled-up towel under your knee. Your other leg should be bent, with that foot flat on the floor. 2. Tighten the thigh muscles of your affected leg by pressing the back of your knee down into the towel. 3. Hold for about 6 seconds, then rest for up to 10 seconds. 4. Repeat 8 to 12 times. 5. Switch legs and repeat steps 1 through 4, even if only one knee is sore. Straight-leg raises to the front 1. Lie on your back with your good knee bent so that your foot rests flat on the floor. Your affected leg should be straight. Make sure that your low back has a normal curve. You should be able to slip your hand in between the floor and the small of your back, with your palm touching the floor and your back touching the back of your hand. 2. Tighten the thigh muscles in your affected leg by pressing the back of your knee flat down to the floor. Hold your knee straight.  
3. Keeping the thigh muscles tight and your leg straight, lift your affected leg up so that your heel is about 12 inches off the floor. Hold for about 6 seconds, then lower slowly. 4. Relax for up to 10 seconds between repetitions. 5. Repeat 8 to 12 times. 6. Switch legs and repeat steps 1 through 5, even if only one knee is sore. Active knee flexion 1. Lie on your stomach with your knees straight. If your kneecap is uncomfortable, roll up a washcloth and put it under your leg just above your kneecap. 2. Lift the foot of your affected leg by bending the knee so that you bring the foot up toward your buttock. If this motion hurts, try it without bending your knee quite as far. This may help you avoid any painful motion. 3. Slowly move your leg up and down. 4. Repeat 8 to 12 times. 5. Switch legs and repeat steps 1 through 4, even if only one knee is sore. Quadriceps stretch (facedown) 1. Lie flat on your stomach, and rest your face on the floor. 2. Wrap a towel or belt strap around the lower part of your affected leg. Then use the towel or belt strap to slowly pull your heel toward your buttock until you feel a stretch. 3. Hold for about 15 to 30 seconds, then relax your leg against the towel or belt strap. 4. Repeat 2 to 4 times. 5. Switch legs and repeat steps 1 through 4, even if only one knee is sore. Stationary exercise bike 1. If you do not have a stationary exercise bike at home, you can find one to ride at your local health club or community center. 2. Adjust the height of the bike seat so that your knee is slightly bent when your leg is extended downward. If your knee hurts when the pedal reaches the top, you can raise the seat so that your knee does not bend as much. 3. Start slowly. At first, try to do 5 to 10 minutes of cycling with little to no resistance. Then increase your time and the resistance bit by bit until you can do 20 to 30 minutes without pain.  
4. If you start to have pain, rest your knee until your pain gets back to the level that is normal for you. Or cycle for less time or with less effort. Follow-up care is a key part of your treatment and safety. Be sure to make and go to all appointments, and call your doctor if you are having problems. It's also a good idea to know your test results and keep a list of the medicines you take. Where can you learn more? Go to http://kimo-alex.info/. Enter C159 in the search box to learn more about \"Knee Arthritis: Exercises. \" Current as of: November 29, 2017 Content Version: 11.8 © 3537-1345 [employer]. Care instructions adapted under license by [employer] (which disclaims liability or warranty for this information). If you have questions about a medical condition or this instruction, always ask your healthcare professional. [address] any warranty or liability for your use of this information. Knee: Exercises Your Care Instructions Here are some examples of exercises for your knee. Start each exercise slowly. Ease off the exercise if you start to have pain. Your doctor or physical therapist will tell you when you can start these exercises and which ones will work best for you. How to do the exercises TxtFeedback 6. Sit with your leg straight and supported on the floor or a firm bed. (If you feel discomfort in the front or back of your knee, place a small towel roll under your knee.) 7. Tighten the muscles on top of your thigh by pressing the back of your knee flat down to the floor. (If you feel discomfort under your kneecap, place a small towel roll under your knee.) 8. Hold for about 6 seconds, then rest for up to 10 seconds. 9. Do 8 to 12 repetitions several times a day. Straight-leg raises to the front 6. Lie on your back with your good knee bent so that your foot rests flat on the floor. Your injured leg should be straight.  Make sure that your low back has a normal curve. You should be able to slip your flat hand in between the floor and the small of your back, with your palm touching the floor and your back touching the back of your hand. 7. Tighten the thigh muscles in the injured leg by pressing the back of your knee flat down to the floor. Hold your knee straight. 8. Keeping the thigh muscles tight, lift your injured leg up so that your heel is about 12 inches off the floor. Hold for about 6 seconds and then lower slowly. 9. Do 8 to 12 repetitions, 3 times a day. Straight-leg raises to the outside 7. Lie on your side, with your injured leg on top. 8. Tighten the front thigh muscles of your injured leg to keep your knee straight. 9. Keep your hip and your leg straight in line with the rest of your body, and keep your knee pointing forward. Do not drop your hip back. 10. Lift your injured leg straight up toward the ceiling, about 12 inches off the floor. Hold for about 6 seconds, then slowly lower your leg. 11. Do 8 to 12 repetitions. Straight-leg raises to the back 6. Lie on your stomach, and lift your leg straight up behind you (toward the ceiling). 7. Lift your toes about 6 inches off the floor, hold for about 6 seconds, then lower slowly. 8. Do 8 to 12 repetitions. Straight-leg raises to the inside 6. Lie on the side of your body with the injured leg. 7. You can either prop your other (good) leg up on a chair, or you can bend your good knee and put that foot in front of your injured knee. Do not drop your hip back. 8. Tighten the muscles on the front of your thigh to straighten your injured knee. 9. Keep your kneecap pointing forward, and lift your whole leg up toward the ceiling about 6 inches. Hold for about 6 seconds, then lower slowly. 10. Do 8 to 12 repetitions. Heel dig bridging 5.  Lie on your back with both knees bent and your ankles bent so that only your heels are digging into the floor. Your knees should be bent about 90 degrees. 6. Then push your heels into the floor, squeeze your buttocks, and lift your hips off the floor until your shoulders, hips, and knees are all in a straight line. 7. Hold for about 6 seconds as you continue to breathe normally, and then slowly lower your hips back down to the floor and rest for up to 10 seconds. 8. Do 8 to 12 repetitions. Hamstring curls 1. Lie on your stomach with your knees straight. If your kneecap is uncomfortable, roll up a washcloth and put it under your leg just above your kneecap. 2. Lift the foot of your injured leg by bending the knee so that you bring the foot up toward your buttock. If this motion hurts, try it without bending your knee quite as far. This may help you avoid any painful motion. 3. Slowly lower your leg back to the floor. 4. Do 8 to 12 repetitions. 5. With permission from your doctor or physical therapist, you may also want to add a cuff weight to your ankle (not more than 5 pounds). With weight, you do not have to lift your leg more than 12 inches to get a hamstring workout. Shallow standing knee bends 1. Stand with your hands lightly resting on a counter or chair in front of you. Put your feet shoulder-width apart. 2. Slowly bend your knees so that you squat down like you are going to sit in a chair. Make sure your knees do not go in front of your toes. 3. Lower yourself about 6 inches. Your heels should remain on the floor at all times. 4. Rise slowly to a standing position. Heel raises 1. Stand with your feet a few inches apart, with your hands lightly resting on a counter or chair in front of you. 2. Slowly raise your heels off the floor while keeping your knees straight. 3. Hold for about 6 seconds, then slowly lower your heels to the floor. 4. Do 8 to 12 repetitions several times during the day. Follow-up care is a key part of your treatment and safety. Be sure to make and go to all appointments, and call your doctor if you are having problems. It's also a good idea to know your test results and keep a list of the medicines you take. Where can you learn more? Go to http://kimo-alex.info/. Enter V723 in the search box to learn more about \"Knee: Exercises. \" Current as of: November 29, 2017 Content Version: 11.8 © 3977-5525 [employer]. Care instructions adapted under license by [employer] (which disclaims liability or warranty for this information). If you have questions about a medical condition or this instruction, always ask your healthcare professional. [address] any warranty or liability for your use of this information. Neck Arthritis: Exercises Your Care Instructions Here are some examples of typical rehabilitation exercises for your condition. Start each exercise slowly. Ease off the exercise if you start to have pain. Your doctor or physical therapist will tell you when you can start these exercises and which ones will work best for you. How to do the exercises Neck stretches to the side 10. This stretch works best if you keep your shoulder down as you lean away from it. To help you remember to do this, start by relaxing your shoulders and lightly holding on to your thighs or your chair. 11. Tilt your head toward your shoulder and hold for 15 to 30 seconds. Let the weight of your head stretch your muscles. 12. Repeat 2 to 4 times toward each shoulder. Chin tuck 10. Lie on the floor with a rolled-up towel under your neck. Your head should be touching the floor. 11. Slowly bring your chin toward your chest. 
12. Hold for a count of 6, and then relax for up to 10 seconds. 13. Repeat 8 to 12 times. Active cervical rotation 12. Sit in a firm chair, or stand up straight. 13. Keeping your chin level, turn your head to the right, and hold for 15 to 30 seconds. 14. Turn your head to the left and hold for 15 to 30 seconds. 15. Repeat 2 to 4 times to each side. Shoulder blade squeeze 9. While standing, squeeze your shoulder blades together. 10. Do not raise your shoulders up as you are squeezing. 11. Hold for 6 seconds. 12. Repeat 8 to 12 times. Shoulder rolls 11. Sit comfortably with your feet shoulder-width apart. You can also do this exercise standing up. 12. Roll your shoulders up, then back, and then down in a smooth, circular motion. 13. Repeat 2 to 4 times. Follow-up care is a key part of your treatment and safety. Be sure to make and go to all appointments, and call your doctor if you are having problems. It's also a good idea to know your test results and keep a list of the medicines you take. Where can you learn more? Go to http://kimo-alex.info/. Enter U307 in the search box to learn more about \"Neck Arthritis: Exercises. \" Current as of: November 29, 2017 Content Version: 11.8 © 6001-7207 [employer]. Care instructions adapted under license by [employer] (which disclaims liability or warranty for this information). If you have questions about a medical condition or this instruction, always ask your healthcare professional. [place] [address] any warranty or liability for your use of this information. Neck: Exercises Your Care Instructions Here are some examples of typical rehabilitation exercises for your condition. Start each exercise slowly. Ease off the exercise if you start to have pain. Your doctor or physical therapist will tell you when you can start these exercises and which ones will work best for you. How to do the exercises Neck stretch 13.  This stretch works best if you keep your shoulder down as you lean away from it. To help you remember to do this, start by relaxing your shoulders and lightly holding on to your thighs or your chair. 14. Tilt your head toward your shoulder and hold for 15 to 30 seconds. Let the weight of your head stretch your muscles. 15. If you would like a little added stretch, use your hand to gently and steadily pull your head toward your shoulder. For example, keeping your right shoulder down, lean your head to the left. 16. Repeat 2 to 4 times toward each shoulder. Diagonal neck stretch 14. Turn your head slightly toward the direction you will be stretching, and tilt your head diagonally toward your chest and hold for 15 to 30 seconds. 15. If you would like a little added stretch, use your hand to gently and steadily pull your head forward on the diagonal. 
16. Repeat 2 to 4 times toward each side. Dorsal glide stretch 16. Sit or stand tall and look straight ahead. 17. Slowly tuck your chin as you glide your head backward over your body 18. Hold for a count of 6, and then relax for up to 10 seconds. 19. Repeat 8 to 12 times. Chest and shoulder stretch 13. Sit or stand tall and glide your head backward as in the dorsal glide stretch. 14. Raise both arms so that your hands are next to your ears. 15. Take a deep breath, and as you breathe out, lower your elbows down and behind your back. You will feel your shoulder blades slide down and together, and at the same time you will feel a stretch across your chest and the front of your shoulders. 16. Hold for about 6 seconds, and then relax for up to 10 seconds. 17. Repeat 8 to 12 times. Strengthening: Hands on head 14. Move your head backward, forward, and side to side against gentle pressure from your hands, holding each position for about 6 seconds. 15. Repeat 8 to 12 times. Follow-up care is a key part of your treatment and safety.  Be sure to make and go to all appointments, and call your doctor if you are having problems. It's also a good idea to know your test results and keep a list of the medicines you take. Where can you learn more? Go to http://kimo-alex.info/. Enter P975 in the search box to learn more about \"Neck: Exercises. \" Current as of: November 29, 2017 Content Version: 11.8 © 1617-8452 [employer]. Care instructions adapted under license by [employer] (which disclaims liability or warranty for this information). If you have questions about a medical condition or this instruction, always ask your healthcare professional. [address] any warranty or liability for your use of this information. Schedule of Personalized Health Plan (Provide Copy to Patient) The best way to stay healthy is to live a healthy lifestyle. A healthy lifestyle includes regular exercise, eating a well-balanced diet, keeping a healthy weight and not smoking. Regular physical exams and screening tests are another important way to take care of yourself. Preventive exams provided by health care providers can find health problems early when treatment works best and can keep you from getting certain diseases or illnesses. Preventive services include exams, lab tests, screenings, shots, monitoring and information to help you take care of your own health. All people over 65 should have a pneumonia shot. Pneumonia shots are usually only needed once in a lifetime unless your doctor decides differently. All people over 65 should have a yearly flu shot. People over 65 are at medium to high risk for Hepatitis B. Three shots are needed for complete protection. In addition to your physical exam, some screening tests are recommended: 
 
Bone mass measurement (dexa scan) is recommended every two years Diabetes Mellitus screening is recommended every year. Glaucoma is an eye disease caused by high pressure in the eye. An eye exam is recommended every year. Cardiovascular screening tests that check your cholesterol and other blood fat (lipid) levels are recommended every five years. Colorectal Cancer screening tests help to find pre-cancerous polyps (growths in the colon) so they can be removed before they turn into cancer. Tests ordered for screening depend on your personal and family history risk factors. Screening for Breast Cancer is recommended yearly with a mammogram. 
 
Screening for Cervical Cancer is recommended every two years (annually for certain risk factors, such as previous history of STD or abnormal PAP in past 7 years), with a Pelvic Exam with PAP Here is a list of your current Health Maintenance items with a due date: 
Health Maintenance Topic Date Due  
 DTaP/Tdap/Td series (1 - Tdap) 03/09/1970  Shingrix Vaccine Age 50> (1 of 2) 03/09/1999  BREAST CANCER SCRN MAMMOGRAM  03/09/1999  Pneumococcal 65+ Low/Medium Risk (2 of 2 - PPSV23) 01/11/2018  MEDICARE YEARLY EXAM  06/14/2018  FOBT Q 1 YEAR AGE 50-75  06/27/2018  Influenza Age 5 to Adult  08/01/2018  GLAUCOMA SCREENING Q2Y  04/30/2020  Hepatitis C Screening  Completed  Bone Densitometry (Dexa) Screening  Completed

## 2018-10-29 NOTE — PROGRESS NOTES
ROOM # 1 Velma Montilla presents today for Chief Complaint Patient presents with [facility] Annual Wellness Visit  Medication Refill Velma Montilla preferred language for health care discussion is english/other. Is someone accompanying this pt? no 
 
Is the patient using any DME equipment during OV? no 
 
Depression Screening: PHQ over the last two weeks 10/29/2018 6/13/2017 10/25/2016 4/21/2016 6/2/2015 5/19/2014 Little interest or pleasure in doing things Not at all Not at all Nearly every day Several days Not at all Not at all Feeling down, depressed, irritable, or hopeless Not at all Not at all Nearly every day Several days Not at all Not at all Total Score PHQ 2 0 0 6 2 0 0 Trouble falling or staying asleep, or sleeping too much - - Nearly every day - - - Feeling tired or having little energy - - Nearly every day - - - Poor appetite, weight loss, or overeating - - Not at all - - - Feeling bad about yourself - or that you are a failure or have let yourself or your family down - - Not at all - - - Trouble concentrating on things such as school, work, reading, or watching TV - - Not at all - - - Moving or speaking so slowly that other people could have noticed; or the opposite being so fidgety that others notice - - Not at all - - - Thoughts of being better off dead, or hurting yourself in some way - - Not at all - - -  
PHQ 9 Score - - 12 - - - How difficult have these problems made it for you to do your work, take care of your home and get along with others - - Not difficult at all - - - Learning Assessment: 
Learning Assessment 5/19/2014 PRIMARY LEARNER Patient HIGHEST LEVEL OF EDUCATION - PRIMARY LEARNER  SOME COLLEGE  
BARRIERS PRIMARY LEARNER NONE PRIMARY LANGUAGE ENGLISH  NEED No  
LEARNER PREFERENCE PRIMARY DEMONSTRATION  
LEARNING SPECIAL TOPICS no  
ANSWERED BY patient RELATIONSHIP SELF Abuse Screening: Abuse Screening Questionnaire 10/29/2018 6/13/2017 12/14/2015 9/8/2015 Do you ever feel afraid of your partner? N N N N Are you in a relationship with someone who physically or mentally threatens you? N N N N Is it safe for you to go home? Jennifer Garcia Fall Risk Fall Risk Assessment, last 12 mths 10/29/2018 6/13/2017 10/25/2016 4/21/2016 6/2/2015 6/2/2015 5/19/2014 Able to walk? Yes Yes Yes Yes Yes Yes Yes Fall in past 12 months? No No Yes No Yes No No  
Fall with injury? - - No - Yes - - Number of falls in past 12 months - - 5 - 1 - - Fall Risk Score - - 5 - 2 - - Health Maintenance reviewed and discussed per provider. Yes Cloy[facility] Care is due for Health Maintenance Due Topic Date Due  
 DTaP/Tdap/Td series (1 - Tdap) 03/09/1970  Shingrix Vaccine Age 50> (1 of 2) 03/09/1999  BREAST CANCER SCRN MAMMOGRAM  03/09/1999  Pneumococcal 65+ Low/Medium Risk (2 of 2 - PPSV23) 01/11/2018  MEDICARE YEARLY EXAM  06/14/2018  FOBT Q 1 YEAR AGE 50-75  06/27/2018  Influenza Age 5 to Adult  08/01/2018 Please order/place referral if appropriate. Advance Directive: 1. Do you have an advance directive in place? Patient Reply: no 
 
2. If not, would you like material regarding how to put one in place? Patient Reply: no 
 
Coordination of Care: 1. Have you been to the ER, urgent care clinic since your last visit? Hospitalized since your last visit? no 
 
2. Have you seen or consulted any other health care providers outside of the [address] [place] [address] since your last visit? Include any pap smears or colon screening. no 
 
Immunization History Administered Date(s) Administered  Influenza High Dose Vaccine PF 10/25/2016  Influenza Vaccine (Tri) Adjuvanted 10/29/2018 Immunization administered by Ruth Garcia LPN with pt's consent. Patient tolerated procedure well. Pt. Observed for 10 mins. No reactions noted/reported, VIS given.

## 2018-10-29 NOTE — PROGRESS NOTES
Lanre Frank is a 70 y.o. female and presents for annual Medicare Wellness Visit. She said that when she goes to the bathroom she feels like her bladder is not fully emptying. This has been going on for 1 year. She has shooting pain on the right hand and middle finger. 3-4 months. She has been using the mouse a lot. Stiffness in neck a lot. She has couple dizzy spells and is seeing her eye doctor. She is taking meclizine. When she goes up stairs she feels like a heaviness and then after  Exercise. Downstairs, her knees want to give out. Pain scale: aches in both knees and neck and pain scale 2/2. No radiation or numbness or tingling. Problem List: Reviewed with patient and discussed risk factors. Patient Active Problem List  
Diagnosis Code  Advance directive discussed with patient Z70.80  
 Glaucoma H40.9 Current medical providers:  Patient Care Team: 
Corrina Paget, MD as PCP - General (Internal Medicine) Gui Lizama MD (Internal Medicine) Orin Peguero MD (Cardiology) PSH: Reviewed with patient Past Surgical History:  
Procedure Laterality Date  HX CATARACT REMOVAL  4/2013  
 left  HX OTHER SURGICAL  1975  
 dnetal upper bridge  HX TUBAL LIGATION    
  
 
SH: Reviewed with patient Social History Tobacco Use  Smoking status: Never Smoker  Smokeless tobacco: Never Used Substance Use Topics  Alcohol use: Yes Alcohol/week: 0.5 oz Types: 1 Glasses of wine per week Comment: Socially.  Drug use: No  
 
 
FH: Reviewed with patient History reviewed. No pertinent family history. Medications/Allergies: Reviewed with patient Current Outpatient Medications on File Prior to Visit Medication Sig Dispense Refill  metoprolol succinate (TOPROL-XL) 25 mg XL tablet TAKE 1/2 TABLET BY MOUTH EVERY DAY 45 Tab 6  
 omeprazole (PRILOSEC OTC) 20 mg tablet Take 20 mg by mouth daily.  nitroglycerin (NITROSTAT) 0.4 mg SL tablet 1 Tab by SubLINGual route every five (5) minutes as needed for Chest Pain. 25 Tab 3  
 aspirin delayed-release 81 mg tablet Take  by mouth daily.  meclizine (ANTIVERT) 25 mg tablet Take 1 Tab by mouth three (3) times daily as needed. 15 Tab 0 No current facility-administered medications on file prior to visit. Allergies Allergen Reactions  Avocado Anaphylaxis Avocado/guacamole  Peanut Anaphylaxis  Scopolamine Nausea Only Objective: 
Visit Vitals /83 (BP 1 Location: Left arm, BP Patient Position: Sitting) Pulse 66 Temp 97.5 °F (36.4 °C) (Oral) Resp 18 Ht 5' 3\" (1.6 m) Wt 158 lb (71.7 kg) SpO2 97% BMI 27.99 kg/m² Body mass index is 27.99 kg/m². Assessment of cognitive impairment: Alert and oriented x 3 Depression Screen: PHQ over the last two weeks 10/29/2018 Little interest or pleasure in doing things Not at all Feeling down, depressed, irritable, or hopeless Not at all Total Score PHQ 2 0 Trouble falling or staying asleep, or sleeping too much - Feeling tired or having little energy - Poor appetite, weight loss, or overeating - Feeling bad about yourself - or that you are a failure or have let yourself or your family down - Trouble concentrating on things such as school, work, reading, or watching TV - Moving or speaking so slowly that other people could have noticed; or the opposite being so fidgety that others notice - Thoughts of being better off dead, or hurting yourself in some way -  
PHQ 9 Score - How difficult have these problems made it for you to do your work, take care of your home and get along with others - Fall Risk Assessment:   
Fall Risk Assessment, last 12 mths 10/29/2018 Able to walk? Yes Fall in past 12 months? No  
Fall with injury? -  
Number of falls in past 12 months - Fall Risk Score - Functional Ability: Does the patient exhibit a steady gait? yes How long did it take the patient to get up and walk from a sitting position? 4 seconds Is the patient self reliant?  (ie can do own laundry, meals, household chores)  yes Does the patient handle his/her own medications? yes Does the patient handle his/her own money? yes Is the patients home safe (ie good lighting, handrails on stairs and bath, etc.)? yes Did you notice or did patient express any hearing difficulties? no 
  
Did you notice or did patient express any vision difficulties? Yes. Pt wears bifocals. Were distance and reading eye charts used? no 
  
 
Advance Care Planning:  
Patient was offered the opportunity to discuss advance care planning:  yes Does patient have an Advance Directive:  no If no, did you provide information on [facility]ctions? No. Pt did not want information regarding this. Plan:   
 
Orders Placed This Encounter  Influenza Vaccine Inactivated (IIV)(FLUAD), Subunit, Adjuvanted, IM, (00704)  METABOLIC PANEL, COMPREHENSIVE  LIPID PANEL  
 CBC W/O DIFF  
 URINALYSIS W/ RFLX MICROSCOPIC Health Maintenance Topic Date Due  
 DTaP/Tdap/Td series (1 - Tdap) 03/09/1970  Shingrix Vaccine Age 50> (1 of 2) 03/09/1999  BREAST CANCER SCRN MAMMOGRAM  03/09/1999  Pneumococcal 65+ Low/Medium Risk (2 of 2 - PPSV23) 01/11/2018  MEDICARE YEARLY EXAM  06/14/2018  FOBT Q 1 YEAR AGE 50-75  06/27/2018  Influenza Age 5 to Adult  08/01/2018  GLAUCOMA SCREENING Q2Y  04/30/2020  Hepatitis C Screening  Completed  Bone Densitometry (Dexa) Screening  Completed *Patient verbalized understanding and agreement with the plan. A copy of the After Visit Summary with personalized health plan was given to the patient today. Allergies and Intolerances: Allergies Allergen Reactions  Avocado Anaphylaxis Avocado/guacamole  Peanut Anaphylaxis  Scopolamine Nausea Only Family History:  
History reviewed. No pertinent family history. Social History: She  reports that  has never smoked. she has never used smokeless tobacco.  She  reports that she drinks about 0.5 oz of alcohol per week. OBJECTIVE:  
Physical exam:  
Visit Vitals /83 (BP 1 Location: Left arm, BP Patient Position: Sitting) Pulse 66 Temp 97.5 °F (36.4 °C) (Oral) Resp 18 Ht 5' 3\" (1.6 m) Wt 158 lb (71.7 kg) SpO2 97% BMI 27.99 kg/m² Generally: Pleasant female in no acute distress Cardiac Exam: regular, rate, and rhythm. Normal S1 and S2. No murmurs, gallops, or rubs Pulmonary exam: Clear to auscultation bilaterally Abdominal exam: Positive bowel sounds in all four quadrants, soft, nondistended, nontender Extremities: 2+ dorsalis pedis pulses bilaterally. No pedal edema  
 bilaterally Neck exam: Good ROM, not problems with flexion/extension Bilateral knee exam: crepitus right > left. . Good ROM, 5/5 strength in lower extremities bilaterally. Reflexes: 2/2 in lower extremities bilaterally. 3rd finger: some decrease ROM with flexion of finger. 2+ radial pulse. 5/5 bilateral hand . LABS/RADIOLOGICAL TESTS: 
Lab Results Component Value Date/Time WBC 8.5 08/03/2017 05:29 AM  
 HGB 13.4 08/03/2017 05:29 AM  
 HCT 41.0 08/03/2017 05:29 AM  
 PLATELET 247 07/34/7390 05:29 AM  
 
Lab Results Component Value Date/Time Sodium 144 10/25/2016 02:32 AM  
 Potassium 3.9 10/25/2016 02:32 AM  
 Chloride 99 10/25/2016 02:32 AM  
 CO2 27 10/25/2016 02:32 AM  
 Glucose 79 10/25/2016 02:32 AM  
 BUN 13 10/25/2016 02:32 AM  
 Creatinine 0.79 10/25/2016 02:32 AM  
 
Lab Results Component Value Date/Time Cholesterol, total 217 (H) 05/19/2014 03:35 PM  
 HDL Cholesterol 65 05/19/2014 03:35 PM  
 LDL, calculated 122 (H) 05/19/2014 03:35 PM  
 Triglyceride 151 (H) 05/19/2014 03:35 PM  
 
No results found for: GPT Previous labs ASSESSMENT/PLAN:   
1. Encounter for Medicare annual wellness exam 
 
2. Benign hypertension without CHF: stable. Continue the toprol, diet and exercise. -     METABOLIC PANEL, COMPREHENSIVE; Future -     LIPID PANEL; Future -     CBC W/O DIFF; Future -     URINALYSIS W/ RFLX MICROSCOPIC; Future 3. Dyslipidemia:we will see what the labs show. Continue diet and exercise. -     METABOLIC PANEL, COMPREHENSIVE; Future -     LIPID PANEL; Future 4. Encounter for immunization 
-     INFLUENZA VACCINE INACTIVATED (IIV), SUBUNIT, ADJUVANTED, IM Think her neck and bilateral knee pain is due to arthritis. She will use heating pad, icy/hot, tiger balm for pain. Her right 3rd finger pain is due to arthritis. Given exercise. 5. Patient verbalized understanding and agreement with the plan. Right 3rd finger pain most likely due to her using mouse a lot. She refuses DEXA scan, mammogram, flu/pneumonia vaccines, glaucoma screening, FOBT--pt had colonoscopy 1-2 years ago. She said she had pneumonia vaccine at pharmacy. She will ask them to fax is the information. 6. Patient was given an after-visit summary. 7. Follow-up Disposition: 
Return in about 1 year (around 10/29/2019) for medicare wellness subsequent exam. or sooner if worsening symptoms.  
 
 
 
 
Brody Quinones M.D.

## 2018-10-29 NOTE — ACP (ADVANCE CARE PLANNING)
Advance Care Planning (ACP) Provider Conversation Snapshot    Date of ACP Conversation: 10/29/18  Persons included in Conversation:  patient  Length of ACP Conversation in minutes:  <16 minutes (Non-Billable)    Authorized Decision Maker (if patient is incapable of making informed decisions): This person is:   Healthcare Agent/Medical Power of  under Advance Directive          For Patients with Decision Making Capacity:   Values/Goals: Exploration of values, goals, and preferences if recovery is not expected, even with continued medical treatment in the event of:  Imminent death    Conversation Outcomes / Follow-Up Plan:   Other Treatment or Goals of Care Decisions:Pt refuses information regarding ACP.

## 2019-03-14 RX ORDER — METOPROLOL SUCCINATE 25 MG/1
TABLET, EXTENDED RELEASE ORAL
Qty: 45 TAB | Refills: 0 | Status: SHIPPED | OUTPATIENT
Start: 2019-03-14 | End: 2019-06-19 | Stop reason: SDUPTHER

## 2019-06-19 ENCOUNTER — OFFICE VISIT (OUTPATIENT)
Dept: INTERNAL MEDICINE CLINIC | Age: 69
End: 2019-06-19

## 2019-06-19 VITALS
SYSTOLIC BLOOD PRESSURE: 133 MMHG | TEMPERATURE: 97.7 F | BODY MASS INDEX: 29.06 KG/M2 | HEIGHT: 63 IN | DIASTOLIC BLOOD PRESSURE: 73 MMHG | WEIGHT: 164 LBS | RESPIRATION RATE: 17 BRPM | HEART RATE: 69 BPM | OXYGEN SATURATION: 98 %
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
## 2019-06-19 DIAGNOSIS — M79.641 RIGHT HAND PAIN: Primary | ICD-10-CM

## 2019-06-19 RX ORDER — METOPROLOL SUCCINATE 25 MG/1
TABLET, EXTENDED RELEASE ORAL
Qty: 45 TAB | Refills: 3 | Status: SHIPPED | OUTPATIENT
Start: 2019-06-19 | End: 2019-06-19 | Stop reason: SDUPTHER

## 2019-06-19 RX ORDER — METOPROLOL SUCCINATE 25 MG/1
TABLET, EXTENDED RELEASE ORAL
Qty: 45 TAB | Refills: 3 | Status: SHIPPED | OUTPATIENT
Start: 2019-06-19

## 2019-06-19 NOTE — PROGRESS NOTES
ROOM # 1  Identified pt with two pt identifiers(name and ). Reviewed record in preparation for visit and have obtained necessary documentation. Chief Complaint   Patient presents with   [address] lt wrist       Ce Shields preferred language for health care discussion is english/other. Is the patient using any DME equipment during OV? [address] is due for:  Health Maintenance Due   Topic    Pneumococcal 65+ years (1 of 2 - PCV13)     Health Maintenance reviewed and discussed per provider  Please order/place referral if appropriate. Advance Directive:  1. Do you have an advance directive in place? Patient Reply: NO    2. If not, would you like material regarding how to put one in place? NO    Coordination of Care:  1. Have you been to the ER, urgent care clinic since your last visit? Yes [facility] for lt wrist sprain   Hospitalized since your last visit? NO    2. Have you seen or consulted any other health care providers outside of the [address] [place] [address] since your last visit? Include any pap smears or colon screening. NO    Patient is accompanied by self I have received verbal consent from Ce Shields to discuss any/all medical information while they are present in the room.     Learning Assessment:  Learning Assessment 2014   PRIMARY LEARNER Patient   HIGHEST LEVEL OF EDUCATION - PRIMARY LEARNER  SOME COLLEGE   BARRIERS PRIMARY LEARNER NONE   PRIMARY LANGUAGE ENGLISH    NEED No   LEARNER PREFERENCE PRIMARY DEMONSTRATION   LEARNING SPECIAL TOPICS no   ANSWERED BY patient   RELATIONSHIP SELF     Depression Screening:  3 most recent [facility] Screens 10/29/2018 2017 10/25/2016 2016 2015 2014   Little interest or pleasure in doing things Not at all Not at all Nearly every day Several days Not at all Not at all   Feeling down, depressed, irritable, or hopeless Not at all Not at all Nearly every day Several days Not at all Not at all   Total Score PHQ 2 0 0 6 2 0 0   Trouble falling or staying asleep, or sleeping too much - - Nearly every day - - -   Feeling tired or having little energy - - Nearly every day - - -   Poor appetite, weight loss, or overeating - - Not at all - - -   Feeling bad about yourself - or that you are a failure or have let yourself or your family down - - Not at all - - -   Trouble concentrating on things such as school, work, reading, or watching TV - - Not at all - - -   Moving or speaking so slowly that other people could have noticed; or the opposite being so fidgety that others notice - - Not at all - - -   Thoughts of being better off dead, or hurting yourself in some way - - Not at all - - -   PHQ 9 Score - - 12 - - -   How difficult have these problems made it for you to do your work, take care of your home and get along with others - - Not difficult at all - - -     Abuse Screening:  Abuse Screening Questionnaire 10/29/2018 6/13/2017 12/14/2015 9/8/2015   Do you ever feel afraid of your partner? N N N N   Are you in a relationship with someone who physically or mentally threatens you? N N N N   Is it safe for you to go home? [place]     Fall Risk  Fall Risk Assessment, last 12 mths 10/29/2018 6/13/2017 10/25/2016 4/21/2016 6/2/2015 6/2/2015 5/19/2014   Able to walk? Yes Yes Yes Yes Yes Yes Yes   Fall in past 12 months? No No Yes No Yes No No   Fall with injury?  - - No - Yes - -   Number of falls in past 12 months - - 5 - 1 - -   Fall Risk Score - - 5 - 2 - -

## 2019-06-19 NOTE — PROGRESS NOTES
Chief Complaint   Patient presents with    Transitions Of Care     Sprain lt wrist        HPI:     rByn Mendoza is a 78 y.o.  female with history of  hypertension  here for the above complaint. She fell on landed with her right extended. She went to urgent care and did x-ray which was negative. She fell on face and . There was a lot of swelling and having stinging sensation on her hand. It is burning sensation. She cannot make a  and still swollen. This happened on 5/28/19. She denies any chest pain, shortness of breath, abdominal pain, headaches or dizziness. She had a tetanus shot. TD. Her eye is better. She is still having pain on the top of her hand and can't make a fist. Ibuprofen does help with pain. Past Medical History:   Diagnosis Date    Advance directive discussed with patient 06/13/2017    Borderline hypertension     Calculus of kidney     Glaucoma 09/08/2014    being followed by DR. Uche Morrow     Past Surgical History:   Procedure Laterality Date    HX CATARACT REMOVAL  4/2013    left    HX OTHER SURGICAL  1975    dnetal upper bridge    HX TUBAL LIGATION       Current Outpatient Medications   Medication Sig    metoprolol succinate (TOPROL-XL) 25 mg XL tablet TAKE 1/2 TABLET BY MOUTH EVERY DAY    omeprazole (PRILOSEC OTC) 20 mg tablet Take 20 mg by mouth daily.  nitroglycerin (NITROSTAT) 0.4 mg SL tablet 1 Tab by SubLINGual route every five (5) minutes as needed for Chest Pain.  aspirin delayed-release 81 mg tablet Take  by mouth daily.  meclizine (ANTIVERT) 25 mg tablet Take 1 Tab by mouth three (3) times daily as needed. No current facility-administered medications for this visit.       Health Maintenance   Topic Date Due    Pneumococcal 65+ years (1 of 2 - PCV13) 03/09/2014    DTaP/Tdap/Td series (1 - Tdap) 10/29/2019 (Originally 3/9/1970)    Shingrix Vaccine Age 50> (1 of 2) 10/29/2019 (Originally 3/9/1999)    BREAST CANCER SCRN MAMMOGRAM 10/29/2019 (Originally 3/9/1999)    FOBT Q 1 YEAR AGE 50-75  10/29/2019 (Originally 6/27/2018)    Influenza Age 5 to Adult  08/01/2019    MEDICARE YEARLY EXAM  10/30/2019    GLAUCOMA SCREENING Q2Y  04/30/2020    Hepatitis C Screening  Completed    Bone Densitometry (Dexa) Screening  Completed     Immunization History   Administered Date(s) Administered    Influenza High Dose Vaccine PF 10/25/2016    Influenza Vaccine (Tri) Adjuvanted 10/29/2018     No LMP recorded. Patient is postmenopausal.        Allergies and Intolerances: Allergies   Allergen Reactions    Avocado Anaphylaxis     Avocado/guacamole    Peanut Anaphylaxis    Scopolamine Nausea Only       Family History:   History reviewed. No pertinent family history. Social History:   She  reports that she has never smoked. She has never used smokeless tobacco.  She  reports that she drinks about 0.5 oz of alcohol per week. ·     OBJECTIVE:   Physical exam:   Visit Vitals  /73 (BP 1 Location: Left arm, BP Patient Position: Sitting)   Pulse 69   Temp 97.7 °F (36.5 °C) (Oral)   Resp 17   Ht 5' 3\" (1.6 m)   Wt 164 lb (74.4 kg)   SpO2 98%   BMI 29.05 kg/m²        Generally: Pleasant female in no acute distress  Cardiac Exam: regular, rate, and rhythm. Normal S1 and S2. No murmurs, gallops, or rubs  Pulmonary exam: Clear to auscultation bilaterally  Right hand exam: Unable to make a complete fist. 2+ radial pulse. TTP on the dorsum aspect of hand.    LABS/RADIOLOGICAL TESTS:  Lab Results   Component Value Date/Time    WBC 8.5 08/03/2017 05:29 AM    HGB 13.4 08/03/2017 05:29 AM    HCT 41.0 08/03/2017 05:29 AM    PLATELET 538 92/74/9502 05:29 AM     Lab Results   Component Value Date/Time    Sodium 144 10/25/2016 02:32 AM    Potassium 3.9 10/25/2016 02:32 AM    Chloride 99 10/25/2016 02:32 AM    CO2 27 10/25/2016 02:32 AM    Glucose 79 10/25/2016 02:32 AM    BUN 13 10/25/2016 02:32 AM    Creatinine 0.79 10/25/2016 02:32 AM     Lab Results Component Value Date/Time    Cholesterol, total 217 (H) 05/19/2014 03:35 PM    HDL Cholesterol 65 05/19/2014 03:35 PM    LDL, calculated 122 (H) 05/19/2014 03:35 PM    Triglyceride 151 (H) 05/19/2014 03:35 PM     No results found for: GPT    Previous labs  ASSESSMENT/PLAN:    1. Right hand pain  -     MRI HAND W CONT RT; Future  -     REFERRAL TO ORTHOPEDICS  [place] [facility] [place] as she lives [place]. 2. Fall, initial encounter  -     REFERRAL TO ORTHOPEDICS    3. Requested Prescriptions     Signed Prescriptions Disp Refills    metoprolol succinate (TOPROL-XL) 25 mg XL tablet 45 Tab 3     Sig: TAKE 1/2 TABLET BY MOUTH EVERY DAY     4. Patient verbalized understanding and agreement with the plan. 5. Patient was given an after-visit summary. 6.   Follow-up and Dispositions    · Return if symptoms worsen or fail to improve  or sooner if worsening symptoms.                West Pearson M.D.

## 2019-07-01 ENCOUNTER — TELEPHONE (OUTPATIENT)
Dept: INTERNAL MEDICINE CLINIC | Age: 69
End: 2019-07-01

## 2019-07-01 NOTE — TELEPHONE ENCOUNTER
I don't have the results. Please find out where she had the MRI done and call the facility to have them fax the results.

## 2019-07-02 NOTE — TELEPHONE ENCOUNTER
Received the results. MRI showed nondisplaced fractures of the 3rd,4th, 5th metacarpal bones in hand and swelling. Possible fracture of the 2nd bone in the hand. Has she seen orthopedics? Is she wearing a hand splint?

## 2019-07-11 DIAGNOSIS — M79.641 RIGHT HAND PAIN: ICD-10-CM
